# Patient Record
Sex: MALE | Race: BLACK OR AFRICAN AMERICAN | NOT HISPANIC OR LATINO | Employment: UNEMPLOYED | URBAN - METROPOLITAN AREA
[De-identification: names, ages, dates, MRNs, and addresses within clinical notes are randomized per-mention and may not be internally consistent; named-entity substitution may affect disease eponyms.]

---

## 2020-05-12 ENCOUNTER — TELEPHONE (OUTPATIENT)
Dept: INTERNAL MEDICINE | Facility: CLINIC | Age: 45
End: 2020-05-12

## 2020-05-12 ENCOUNTER — TELEPHONE (OUTPATIENT)
Dept: ORTHOPEDICS | Facility: CLINIC | Age: 45
End: 2020-05-12

## 2020-05-12 NOTE — TELEPHONE ENCOUNTER
----- Message from Amanda Sharpe sent at 5/12/2020  2:28 PM CDT -----  Contact: Pt  Patient is calling regarding scheduling an appointment with Ortho providers. Please call back at [phone]. Thanks

## 2022-09-09 ENCOUNTER — HOSPITAL ENCOUNTER (EMERGENCY)
Facility: HOSPITAL | Age: 47
Discharge: HOME | End: 2022-09-10
Attending: EMERGENCY MEDICINE
Payer: MEDICAID

## 2022-09-09 DIAGNOSIS — M43.16 SPONDYLOLISTHESIS OF LUMBAR REGION: Primary | ICD-10-CM

## 2022-09-09 DIAGNOSIS — N39.0 URINARY TRACT INFECTION WITHOUT HEMATURIA, SITE UNSPECIFIED: ICD-10-CM

## 2022-09-09 PROCEDURE — 99285 EMERGENCY DEPT VISIT HI MDM: CPT | Mod: 25

## 2022-09-10 VITALS
DIASTOLIC BLOOD PRESSURE: 78 MMHG | BODY MASS INDEX: 33.06 KG/M2 | WEIGHT: 244.06 LBS | HEART RATE: 84 BPM | OXYGEN SATURATION: 98 % | HEIGHT: 72 IN | SYSTOLIC BLOOD PRESSURE: 128 MMHG | TEMPERATURE: 98 F | RESPIRATION RATE: 18 BRPM

## 2022-09-10 LAB
ALBUMIN SERPL BCP-MCNC: 3.8 G/DL (ref 3.5–5.2)
ALP SERPL-CCNC: 67 U/L (ref 55–135)
ALT SERPL W/O P-5'-P-CCNC: 31 U/L (ref 10–44)
ANION GAP SERPL CALC-SCNC: 9 MMOL/L (ref 8–16)
AST SERPL-CCNC: 23 U/L (ref 10–40)
BASOPHILS # BLD AUTO: 0.04 K/UL (ref 0–0.2)
BASOPHILS NFR BLD: 0.6 % (ref 0–1.9)
BILIRUB SERPL-MCNC: 0.3 MG/DL (ref 0.1–1)
BILIRUB UR QL STRIP: NEGATIVE
BUN SERPL-MCNC: 14 MG/DL (ref 6–20)
CALCIUM SERPL-MCNC: 8.9 MG/DL (ref 8.7–10.5)
CHLORIDE SERPL-SCNC: 104 MMOL/L (ref 95–110)
CLARITY UR: CLEAR
CO2 SERPL-SCNC: 24 MMOL/L (ref 23–29)
COLOR UR: YELLOW
CREAT SERPL-MCNC: 1.2 MG/DL (ref 0.5–1.4)
DIFFERENTIAL METHOD: ABNORMAL
EOSINOPHIL # BLD AUTO: 0.4 K/UL (ref 0–0.5)
EOSINOPHIL NFR BLD: 4.9 % (ref 0–8)
ERYTHROCYTE [DISTWIDTH] IN BLOOD BY AUTOMATED COUNT: 13.8 % (ref 11.5–14.5)
EST. GFR  (NO RACE VARIABLE): >60 ML/MIN/1.73 M^2
GLUCOSE SERPL-MCNC: 97 MG/DL (ref 70–110)
GLUCOSE UR QL STRIP: NEGATIVE
HCT VFR BLD AUTO: 42.7 % (ref 40–54)
HGB BLD-MCNC: 14.5 G/DL (ref 14–18)
HGB UR QL STRIP: NEGATIVE
IMM GRANULOCYTES # BLD AUTO: 0.05 K/UL (ref 0–0.04)
IMM GRANULOCYTES NFR BLD AUTO: 0.7 % (ref 0–0.5)
KETONES UR QL STRIP: NEGATIVE
LEUKOCYTE ESTERASE UR QL STRIP: ABNORMAL
LYMPHOCYTES # BLD AUTO: 2.5 K/UL (ref 1–4.8)
LYMPHOCYTES NFR BLD: 35.2 % (ref 18–48)
MCH RBC QN AUTO: 30.6 PG (ref 27–31)
MCHC RBC AUTO-ENTMCNC: 34 G/DL (ref 32–36)
MCV RBC AUTO: 90 FL (ref 82–98)
MICROSCOPIC COMMENT: ABNORMAL
MONOCYTES # BLD AUTO: 0.8 K/UL (ref 0.3–1)
MONOCYTES NFR BLD: 10.5 % (ref 4–15)
NEUTROPHILS # BLD AUTO: 3.5 K/UL (ref 1.8–7.7)
NEUTROPHILS NFR BLD: 48.1 % (ref 38–73)
NITRITE UR QL STRIP: NEGATIVE
NRBC BLD-RTO: 0 /100 WBC
PH UR STRIP: 6 [PH] (ref 5–8)
PLATELET # BLD AUTO: 247 K/UL (ref 150–450)
PMV BLD AUTO: 10 FL (ref 9.2–12.9)
POTASSIUM SERPL-SCNC: 4.6 MMOL/L (ref 3.5–5.1)
PROT SERPL-MCNC: 7.4 G/DL (ref 6–8.4)
PROT UR QL STRIP: NEGATIVE
RBC # BLD AUTO: 4.74 M/UL (ref 4.6–6.2)
RBC #/AREA URNS HPF: 3 /HPF (ref 0–4)
SODIUM SERPL-SCNC: 137 MMOL/L (ref 136–145)
SP GR UR STRIP: 1.02 (ref 1–1.03)
SQUAMOUS #/AREA URNS HPF: 2 /HPF
URN SPEC COLLECT METH UR: ABNORMAL
UROBILINOGEN UR STRIP-ACNC: NEGATIVE EU/DL
WBC # BLD AUTO: 7.16 K/UL (ref 3.9–12.7)
WBC #/AREA URNS HPF: 51 /HPF (ref 0–5)
WBC CLUMPS URNS QL MICRO: ABNORMAL

## 2022-09-10 PROCEDURE — 80053 COMPREHEN METABOLIC PANEL: CPT | Performed by: EMERGENCY MEDICINE

## 2022-09-10 PROCEDURE — 85025 COMPLETE CBC W/AUTO DIFF WBC: CPT | Performed by: EMERGENCY MEDICINE

## 2022-09-10 PROCEDURE — 87147 CULTURE TYPE IMMUNOLOGIC: CPT | Performed by: EMERGENCY MEDICINE

## 2022-09-10 PROCEDURE — 96372 THER/PROPH/DIAG INJ SC/IM: CPT | Performed by: EMERGENCY MEDICINE

## 2022-09-10 PROCEDURE — 87086 URINE CULTURE/COLONY COUNT: CPT | Performed by: EMERGENCY MEDICINE

## 2022-09-10 PROCEDURE — 63600175 PHARM REV CODE 636 W HCPCS: Performed by: EMERGENCY MEDICINE

## 2022-09-10 PROCEDURE — 87088 URINE BACTERIA CULTURE: CPT | Performed by: EMERGENCY MEDICINE

## 2022-09-10 PROCEDURE — 81000 URINALYSIS NONAUTO W/SCOPE: CPT | Performed by: EMERGENCY MEDICINE

## 2022-09-10 RX ORDER — NAPROXEN 500 MG/1
500 TABLET ORAL 2 TIMES DAILY WITH MEALS
Qty: 60 TABLET | Refills: 0 | Status: SHIPPED | OUTPATIENT
Start: 2022-09-10

## 2022-09-10 RX ORDER — PANTOPRAZOLE SODIUM 20 MG/1
40 TABLET, DELAYED RELEASE ORAL DAILY
Qty: 60 TABLET | Refills: 0 | Status: SHIPPED | OUTPATIENT
Start: 2022-09-10 | End: 2022-10-10

## 2022-09-10 RX ORDER — KETOROLAC TROMETHAMINE 30 MG/ML
30 INJECTION, SOLUTION INTRAMUSCULAR; INTRAVENOUS
Status: COMPLETED | OUTPATIENT
Start: 2022-09-10 | End: 2022-09-10

## 2022-09-10 RX ORDER — CEFDINIR 300 MG/1
300 CAPSULE ORAL 2 TIMES DAILY
Qty: 20 CAPSULE | Refills: 0 | Status: SHIPPED | OUTPATIENT
Start: 2022-09-10 | End: 2022-09-20

## 2022-09-10 RX ORDER — KETOROLAC TROMETHAMINE 30 MG/ML
15 INJECTION, SOLUTION INTRAMUSCULAR; INTRAVENOUS
Status: DISCONTINUED | OUTPATIENT
Start: 2022-09-10 | End: 2022-09-10

## 2022-09-10 RX ADMIN — KETOROLAC TROMETHAMINE 30 MG: 30 INJECTION, SOLUTION INTRAMUSCULAR; INTRAVENOUS at 01:09

## 2022-09-10 NOTE — ED NOTES
Patient identifiers verified and correct for Efra Felipe.  Patient presents to ED with c/o lower back pain and bilateral ankle swelling that has been present for months, however the pain has progressively worsened. Pain is currently rated 10/10.    LOC: The patient is awake, alert and aware of environment with an appropriate affect, the patient is oriented x 3 and speaking appropriately.  APPEARANCE: Patient resting comfortably and in no acute distress, patient is clean and well groomed, patient's clothing is properly fastened.  SKIN: The skin is warm and dry, color consistent with ethnicity, patient has normal skin turgor and moist mucus membranes, skin intact, no breakdown or bruising noted.  MUSCULOSKELETAL: Patient moving all extremities spontaneously.  RESPIRATORY: Airway is open and patent, respirations are spontaneous.  CARDIAC: Patient has a normal rate, no periphreal edema noted, capillary refill < 3 seconds.  ABDOMEN: Soft and non tender to palpation.

## 2022-09-10 NOTE — ED PROVIDER NOTES
SCRIBE #1 NOTE: I, Bo Mota, am scribing for, and in the presence of, Hilairo Bradley MD. I have scribed the entire note.       History     Chief Complaint   Patient presents with    Joint Swelling     Bilat ankle swelling and lower back pain x 6 months. Worse today. Denies cp or sob     Review of patient's allergies indicates:   Allergen Reactions    Iodine and iodide containing products Itching    Shellfish containing products Itching         History of Present Illness     HPI    9/9/2022, 11:51 PM  History obtained from the patient      History of Present Illness: Efra Felipe is a 47 y.o. male patient who presents to the Emergency Department for evaluation of lower Back Pain which onset gradually over the course of 6 months. The pt has been dealing with the pain but decided it was time to get it evaluated. Symptoms are constant and moderate in severity. No mitigating or exacerbating factors reported. Associated sxs include pain radiating down both legs, and swollen ankles. Patient denies any HA, fever, dysuria, abd pain, b/b dysfunction, IVDU, trauma, fever, chills, malignancy, and all other sxs at this time. No Prior Tx. No further complaints or concerns at this time.       Arrival mode: Personal vehicle     PCP: Primary Doctor No        Past Medical History:  No past medical history on file.    Past Surgical History:  No past surgical history on file.      Family History:  Family History   Problem Relation Age of Onset    Birth defects Neg Hx        Social History:  Social History     Tobacco Use    Smoking status: Every Day     Packs/day: 1.00     Types: Cigarettes    Smokeless tobacco: Not on file   Substance and Sexual Activity    Alcohol use: No    Drug use: No    Sexual activity: Not on file        Review of Systems     Review of Systems   Constitutional:  Negative for chills and fever.   HENT:  Negative for congestion and sore throat.    Respiratory:  Negative for shortness of breath.     Cardiovascular:  Negative for chest pain.   Gastrointestinal:  Negative for abdominal pain and nausea.   Genitourinary:  Negative for dysuria.   Musculoskeletal:  Positive for back pain, joint swelling (Ankles) and myalgias (Generalized legs).   Skin:  Negative for rash.   Neurological:  Negative for weakness and headaches.   Hematological:  Does not bruise/bleed easily.   All other systems reviewed and are negative.     Physical Exam     Initial Vitals [09/09/22 2211]   BP Pulse Resp Temp SpO2   133/84 89 18 98.7 °F (37.1 °C) 97 %      MAP       --          Physical Exam  Nursing Notes and Vital Signs Reviewed.  Constitutional: Patient is in no acute distress. Well-developed and well-nourished.  Head: Atraumatic. Normocephalic.  Eyes: PERRL. EOM intact. Conjunctivae are not pale. No scleral icterus.  ENT: Mucous membranes are moist. Oropharynx is clear and symmetric.    Neck: Supple. Full ROM. No lymphadenopathy.  Cardiovascular: Regular rate. Regular rhythm. No murmurs, rubs, or gallops. Distal pulses are 2+ and symmetric.  Pulmonary/Chest: No respiratory distress. Clear to auscultation bilaterally. No wheezing or rales.  Abdominal: Soft and non-distended.  There is no tenderness.  No rebound, guarding, or rigidity. Good bowel sounds.  Genitourinary: No CVA tenderness  Musculoskeletal: Moves all extremities. No obvious deformities. No pitting edema. No calf tenderness. No midline spinal tenderness. No pelvic instability.   Skin: Warm and dry.  Neurological:  Alert, awake, and appropriate.  Normal speech.  No acute focal neurological deficits are appreciated. Stands and ambulates independently. Sensation intact globally to light touch.   Psychiatric: Normal affect. Good eye contact. Appropriate in content.     ED Course   Procedures  ED Vital Signs:  Vitals:    09/09/22 2211 09/10/22 0115   BP: 133/84 128/78   Pulse: 89 84   Resp: 18 18   Temp: 98.7 °F (37.1 °C) 98.2 °F (36.8 °C)   TempSrc: Oral Oral   SpO2: 97%  98%   Weight: 110.7 kg (244 lb 0.8 oz)    Height: 6' (1.829 m)        Abnormal Lab Results:  Labs Reviewed   CBC W/ AUTO DIFFERENTIAL - Abnormal; Notable for the following components:       Result Value    Immature Granulocytes 0.7 (*)     Immature Grans (Abs) 0.05 (*)     All other components within normal limits   URINALYSIS, REFLEX TO URINE CULTURE - Abnormal; Notable for the following components:    Leukocytes, UA 3+ (*)     All other components within normal limits    Narrative:     Specimen Source->Urine   URINALYSIS MICROSCOPIC - Abnormal; Notable for the following components:    WBC, UA 51 (*)     WBC Clumps, UA Few (*)     All other components within normal limits    Narrative:     Specimen Source->Urine   CULTURE, URINE   COMPREHENSIVE METABOLIC PANEL        All Lab Results:  Results for orders placed or performed during the hospital encounter of 09/09/22   CBC auto differential   Result Value Ref Range    WBC 7.16 3.90 - 12.70 K/uL    RBC 4.74 4.60 - 6.20 M/uL    Hemoglobin 14.5 14.0 - 18.0 g/dL    Hematocrit 42.7 40.0 - 54.0 %    MCV 90 82 - 98 fL    MCH 30.6 27.0 - 31.0 pg    MCHC 34.0 32.0 - 36.0 g/dL    RDW 13.8 11.5 - 14.5 %    Platelets 247 150 - 450 K/uL    MPV 10.0 9.2 - 12.9 fL    Immature Granulocytes 0.7 (H) 0.0 - 0.5 %    Gran # (ANC) 3.5 1.8 - 7.7 K/uL    Immature Grans (Abs) 0.05 (H) 0.00 - 0.04 K/uL    Lymph # 2.5 1.0 - 4.8 K/uL    Mono # 0.8 0.3 - 1.0 K/uL    Eos # 0.4 0.0 - 0.5 K/uL    Baso # 0.04 0.00 - 0.20 K/uL    nRBC 0 0 /100 WBC    Gran % 48.1 38.0 - 73.0 %    Lymph % 35.2 18.0 - 48.0 %    Mono % 10.5 4.0 - 15.0 %    Eosinophil % 4.9 0.0 - 8.0 %    Basophil % 0.6 0.0 - 1.9 %    Differential Method Automated    Comprehensive metabolic panel   Result Value Ref Range    Sodium 137 136 - 145 mmol/L    Potassium 4.6 3.5 - 5.1 mmol/L    Chloride 104 95 - 110 mmol/L    CO2 24 23 - 29 mmol/L    Glucose 97 70 - 110 mg/dL    BUN 14 6 - 20 mg/dL    Creatinine 1.2 0.5 - 1.4 mg/dL    Calcium 8.9  8.7 - 10.5 mg/dL    Total Protein 7.4 6.0 - 8.4 g/dL    Albumin 3.8 3.5 - 5.2 g/dL    Total Bilirubin 0.3 0.1 - 1.0 mg/dL    Alkaline Phosphatase 67 55 - 135 U/L    AST 23 10 - 40 U/L    ALT 31 10 - 44 U/L    Anion Gap 9 8 - 16 mmol/L    eGFR >60 >60 mL/min/1.73 m^2   Urinalysis, Reflex to Urine Culture Urine, Clean Catch    Specimen: Urine   Result Value Ref Range    Specimen UA Urine, Clean Catch     Color, UA Yellow Yellow, Straw, Joana    Appearance, UA Clear Clear    pH, UA 6.0 5.0 - 8.0    Specific Gravity, UA 1.020 1.005 - 1.030    Protein, UA Negative Negative    Glucose, UA Negative Negative    Ketones, UA Negative Negative    Bilirubin (UA) Negative Negative    Occult Blood UA Negative Negative    Nitrite, UA Negative Negative    Urobilinogen, UA Negative <2.0 EU/dL    Leukocytes, UA 3+ (A) Negative   Urinalysis Microscopic   Result Value Ref Range    RBC, UA 3 0 - 4 /hpf    WBC, UA 51 (H) 0 - 5 /hpf    WBC Clumps, UA Few (A) None-Rare    Squam Epithel, UA 2 /hpf    Microscopic Comment SEE COMMENT         Imaging Results:  Imaging Results               CT Renal Stone Study ABD Pelvis WO (Final result)  Result time 09/10/22 00:11:38      Final result by Jae Faulkner MD (09/10/22 00:11:38)                   Impression:      Cholelithiasis without definite acute cholecystitis.    L4 pars defects bilaterally with grade 2 anterolisthesis of L4 on L5 and associated osseous sclerosis with near complete disc space height loss and uncovering of the disc at this level, a potential source of lower back pain.    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    This report was flagged in Epic as abnormal.    All CT scans at this facility are performed  using dose modulation techniques as appropriate to performed exam including the following:  automated exposure control; adjustment of mA and/or kV according to the patients size (this includes techniques or standardized protocols for targeted exams where dose is matched to  indication/reason for exam: i.e. extremities or head);  iterative reconstruction technique.      Electronically signed by: Jae Lucie  Date:    09/10/2022  Time:    00:11               Narrative:    EXAMINATION:  CT RENAL STONE STUDY ABD PELVIS WO    CLINICAL HISTORY:  Flank pain, kidney stone suspected;    TECHNIQUE:  Low dose axial images, sagittal and coronal reformations were obtained from the lung bases to the pubic symphysis.  Contrast was not administered.    COMPARISON:  None    FINDINGS:  Heart: Normal in size. No pericardial effusion.    Lung Bases: Well aerated, without consolidation or pleural fluid.    Liver: Normal in size and attenuation, with no focal hepatic lesions.    Gallbladder: Cholelithiasis without findings to specifically suggest acute cholecystitis.    Bile Ducts: No evidence of dilated ducts.    Pancreas: No mass or peripancreatic fat stranding.    Spleen: Unremarkable.    Adrenals: Unremarkable.    Kidneys/ Ureters: No hydronephrosis.  No obstructive uropathy.  No nonobstructive nephrolithiasis.    Bladder: No evidence of wall thickening.    Reproductive organs: Unremarkable.    GI Tract/Mesentery: No evidence of bowel obstruction or inflammation.  No evidence of appendicitis.    Peritoneal Space: No ascites. No free air.  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    Retroperitoneum: No significant adenopathy.    Abdominal wall: Unremarkable.    Vasculature: No significant atherosclerosis or aneurysm.    Bones: No acute fracture.  L4 pars defects bilaterally with grade 2 anterolisthesis of L4 on L5 and associated osseous sclerosis with near complete disc space height loss and uncovering of the disc at this level, a potential source of lower back pain.                                                  The Emergency Provider reviewed the vital signs and test results, which are outlined above.     ED Discussion       1:25 AM: Reassessed pt at this time.   Discussed with pt all pertinent ED  information and results. Discussed pt dx and plan of tx. Gave pt all f/u and return to the ED instructions. All questions and concerns were addressed at this time. Pt expresses understanding of information and instructions, and is comfortable with plan to discharge. Pt is stable for discharge.    I discussed with patient and/or family/caretaker that evaluation in the ED does not suggest any emergent or life threatening medical conditions requiring immediate intervention beyond what was provided in the ED, and I believe patient is safe for discharge.  Regardless, an unremarkable evaluation in the ED does not preclude the development or presence of a serious of life threatening condition. As such, patient was instructed to return immediately for any worsening or change in current symptoms.     ED Course as of 09/10/22 0344   Sat Sep 10, 2022   0056 Patient with no red flags of back pain in the ED. Will trial NSAIDs, given f/u instructions.  [BA]      ED Course User Index  [BA] Hilario Bradley MD     Medical Decision Making:   Clinical Tests:   Lab Tests: Ordered and Reviewed  Radiological Study: Ordered and Reviewed         ED Medication(s):  Medications   ketorolac injection 30 mg (30 mg Intramuscular Given 9/10/22 0108)       Discharge Medication List as of 9/10/2022 12:56 AM        START taking these medications    Details   cefdinir (OMNICEF) 300 MG capsule Take 1 capsule (300 mg total) by mouth 2 (two) times daily. for 10 days, Starting Sat 9/10/2022, Until Tue 9/20/2022, Normal      naproxen (NAPROSYN) 500 MG tablet Take 1 tablet (500 mg total) by mouth 2 (two) times daily with meals., Starting Sat 9/10/2022, Normal      pantoprazole (PROTONIX) 20 MG tablet Take 2 tablets (40 mg total) by mouth once daily., Starting Sat 9/10/2022, Until Mon 10/10/2022, Normal              Follow-up Information       [facility] - Emergency Dept.. Go today.    Specialty: Emergency Medicine  Why: If symptoms worsen, For re-evaluation  and further treatment, As needed  Contact information:  [address] [facility] [address] [phone]             [facility]. Schedule an appointment as soon as possible for a visit in 1 week.    Specialty: Neurosurgery  Why: For re-evaluation and further treatment  Contact information:  [address] [facility] [id number] [phone]  Additional information:  Please park on the [address] side and use the Clinic entrance. Check in at Patient Registration or use a kiosk for self check-in.             [facility] - Emergency Dept.. Go today.    Specialty: Emergency Medicine  Why: If symptoms worsen, For re-evaluation and further treatment, As needed  Contact information:  [address] [facility] [address] [phone]             Your Primary Care Provider. Schedule an appointment as soon as possible for a visit in 1 week.    Why: For re-evaluation and further treatment                               Scribe Attestation:   Scribe #1: I performed the above scribed service and the documentation accurately describes the services I performed. I attest to the accuracy of the note.     Attending:   Physician Attestation Statement for Scribe #1: I, Hilario Bradley MD, personally performed the services described in this documentation, as scribed by Bo Mota, in my presence, and it is both accurate and complete.           Clinical Impression       ICD-10-CM ICD-9-CM   1. Spondylolisthesis of lumbar region  M43.16 738.4   2. Urinary tract infection without hematuria, site unspecified  N39.0 599.0       Disposition:   Disposition: Discharged  Condition: Stable      Hilario Bradley MD  09/10/22 0346

## 2022-09-11 LAB — BACTERIA UR CULT: ABNORMAL

## 2024-04-03 ENCOUNTER — HOSPITAL ENCOUNTER (INPATIENT)
Facility: HOSPITAL | Age: 49
LOS: 3 days | Discharge: HOME | DRG: 419 | End: 2024-04-06
Attending: STUDENT IN AN ORGANIZED HEALTH CARE EDUCATION/TRAINING PROGRAM | Admitting: HOSPITALIST
Payer: MEDICAID

## 2024-04-03 DIAGNOSIS — R10.9 FLANK PAIN: ICD-10-CM

## 2024-04-03 DIAGNOSIS — R07.9 CHEST PAIN: ICD-10-CM

## 2024-04-03 DIAGNOSIS — K80.70 CHOLELITHIASIS WITH CHOLEDOCHOLITHIASIS: Primary | ICD-10-CM

## 2024-04-03 DIAGNOSIS — R74.8 ELEVATED LIVER ENZYMES: ICD-10-CM

## 2024-04-03 DIAGNOSIS — K80.50 CHOLEDOCHOLITHIASIS: ICD-10-CM

## 2024-04-03 LAB
ALBUMIN SERPL BCP-MCNC: 3.8 G/DL (ref 3.5–5.2)
ALP SERPL-CCNC: 459 U/L (ref 55–135)
ALT SERPL W/O P-5'-P-CCNC: 685 U/L (ref 10–44)
ANION GAP SERPL CALC-SCNC: 12 MMOL/L (ref 8–16)
AST SERPL-CCNC: 279 U/L (ref 10–40)
BASOPHILS # BLD AUTO: 0.04 K/UL (ref 0–0.2)
BASOPHILS NFR BLD: 0.7 % (ref 0–1.9)
BILIRUB SERPL-MCNC: 4.5 MG/DL (ref 0.1–1)
BUN SERPL-MCNC: 12 MG/DL (ref 6–20)
CALCIUM SERPL-MCNC: 9.7 MG/DL (ref 8.7–10.5)
CHLORIDE SERPL-SCNC: 105 MMOL/L (ref 95–110)
CO2 SERPL-SCNC: 21 MMOL/L (ref 23–29)
CREAT SERPL-MCNC: 1.1 MG/DL (ref 0.5–1.4)
DIFFERENTIAL METHOD BLD: ABNORMAL
EOSINOPHIL # BLD AUTO: 0.3 K/UL (ref 0–0.5)
EOSINOPHIL NFR BLD: 4.6 % (ref 0–8)
ERYTHROCYTE [DISTWIDTH] IN BLOOD BY AUTOMATED COUNT: 15.1 % (ref 11.5–14.5)
EST. GFR  (NO RACE VARIABLE): >60 ML/MIN/1.73 M^2
GLUCOSE SERPL-MCNC: 100 MG/DL (ref 70–110)
HCT VFR BLD AUTO: 43.5 % (ref 40–54)
HGB BLD-MCNC: 14.8 G/DL (ref 14–18)
IMM GRANULOCYTES # BLD AUTO: 0.02 K/UL (ref 0–0.04)
IMM GRANULOCYTES NFR BLD AUTO: 0.3 % (ref 0–0.5)
LIPASE SERPL-CCNC: 38 U/L (ref 4–60)
LYMPHOCYTES # BLD AUTO: 2.5 K/UL (ref 1–4.8)
LYMPHOCYTES NFR BLD: 42.1 % (ref 18–48)
MCH RBC QN AUTO: 30.7 PG (ref 27–31)
MCHC RBC AUTO-ENTMCNC: 34 G/DL (ref 32–36)
MCV RBC AUTO: 90 FL (ref 82–98)
MONOCYTES # BLD AUTO: 0.4 K/UL (ref 0.3–1)
MONOCYTES NFR BLD: 7.3 % (ref 4–15)
NEUTROPHILS # BLD AUTO: 2.6 K/UL (ref 1.8–7.7)
NEUTROPHILS NFR BLD: 45 % (ref 38–73)
NRBC BLD-RTO: 0 /100 WBC
PLATELET # BLD AUTO: 240 K/UL (ref 150–450)
PMV BLD AUTO: 10.5 FL (ref 9.2–12.9)
POTASSIUM SERPL-SCNC: 4.1 MMOL/L (ref 3.5–5.1)
PROT SERPL-MCNC: 8 G/DL (ref 6–8.4)
RBC # BLD AUTO: 4.82 M/UL (ref 4.6–6.2)
SODIUM SERPL-SCNC: 138 MMOL/L (ref 136–145)
WBC # BLD AUTO: 5.87 K/UL (ref 3.9–12.7)

## 2024-04-03 PROCEDURE — 25000003 PHARM REV CODE 250: Performed by: NURSE PRACTITIONER

## 2024-04-03 PROCEDURE — 63600175 PHARM REV CODE 636 W HCPCS: Performed by: NURSE PRACTITIONER

## 2024-04-03 PROCEDURE — 83690 ASSAY OF LIPASE: CPT | Performed by: STUDENT IN AN ORGANIZED HEALTH CARE EDUCATION/TRAINING PROGRAM

## 2024-04-03 PROCEDURE — 11000001 HC ACUTE MED/SURG PRIVATE ROOM

## 2024-04-03 PROCEDURE — 80053 COMPREHEN METABOLIC PANEL: CPT | Performed by: STUDENT IN AN ORGANIZED HEALTH CARE EDUCATION/TRAINING PROGRAM

## 2024-04-03 PROCEDURE — 85025 COMPLETE CBC W/AUTO DIFF WBC: CPT | Performed by: STUDENT IN AN ORGANIZED HEALTH CARE EDUCATION/TRAINING PROGRAM

## 2024-04-03 RX ORDER — SODIUM CHLORIDE 0.9 % (FLUSH) 0.9 %
3 SYRINGE (ML) INJECTION EVERY 12 HOURS PRN
Status: DISCONTINUED | OUTPATIENT
Start: 2024-04-03 | End: 2024-04-06 | Stop reason: HOSPADM

## 2024-04-03 RX ORDER — HYDROCODONE BITARTRATE AND ACETAMINOPHEN 5; 325 MG/1; MG/1
1 TABLET ORAL EVERY 6 HOURS PRN
Status: DISCONTINUED | OUTPATIENT
Start: 2024-04-03 | End: 2024-04-03

## 2024-04-03 RX ORDER — MORPHINE SULFATE 4 MG/ML
4 INJECTION, SOLUTION INTRAMUSCULAR; INTRAVENOUS EVERY 4 HOURS PRN
Status: DISCONTINUED | OUTPATIENT
Start: 2024-04-03 | End: 2024-04-06 | Stop reason: HOSPADM

## 2024-04-03 RX ORDER — NALOXONE HCL 0.4 MG/ML
0.02 VIAL (ML) INJECTION
Status: DISCONTINUED | OUTPATIENT
Start: 2024-04-03 | End: 2024-04-06 | Stop reason: HOSPADM

## 2024-04-03 RX ORDER — SODIUM CHLORIDE, SODIUM LACTATE, POTASSIUM CHLORIDE, CALCIUM CHLORIDE 600; 310; 30; 20 MG/100ML; MG/100ML; MG/100ML; MG/100ML
INJECTION, SOLUTION INTRAVENOUS CONTINUOUS
Status: DISCONTINUED | OUTPATIENT
Start: 2024-04-03 | End: 2024-04-06 | Stop reason: HOSPADM

## 2024-04-03 RX ORDER — ALUMINUM HYDROXIDE, MAGNESIUM HYDROXIDE, AND SIMETHICONE 1200; 120; 1200 MG/30ML; MG/30ML; MG/30ML
30 SUSPENSION ORAL 4 TIMES DAILY PRN
Status: DISCONTINUED | OUTPATIENT
Start: 2024-04-03 | End: 2024-04-06 | Stop reason: HOSPADM

## 2024-04-03 RX ORDER — TALC
6 POWDER (GRAM) TOPICAL NIGHTLY PRN
Status: DISCONTINUED | OUTPATIENT
Start: 2024-04-03 | End: 2024-04-06 | Stop reason: HOSPADM

## 2024-04-03 RX ORDER — SIMETHICONE 80 MG
1 TABLET,CHEWABLE ORAL 4 TIMES DAILY PRN
Status: DISCONTINUED | OUTPATIENT
Start: 2024-04-03 | End: 2024-04-06 | Stop reason: HOSPADM

## 2024-04-03 RX ORDER — OXYCODONE HYDROCHLORIDE 5 MG/1
5 TABLET ORAL EVERY 6 HOURS PRN
Status: DISCONTINUED | OUTPATIENT
Start: 2024-04-04 | End: 2024-04-06 | Stop reason: HOSPADM

## 2024-04-03 RX ORDER — PROCHLORPERAZINE EDISYLATE 5 MG/ML
5 INJECTION INTRAMUSCULAR; INTRAVENOUS EVERY 6 HOURS PRN
Status: DISCONTINUED | OUTPATIENT
Start: 2024-04-03 | End: 2024-04-06 | Stop reason: HOSPADM

## 2024-04-03 RX ORDER — GLUCAGON 1 MG
1 KIT INJECTION
Status: DISCONTINUED | OUTPATIENT
Start: 2024-04-03 | End: 2024-04-06 | Stop reason: HOSPADM

## 2024-04-03 RX ORDER — IBUPROFEN 200 MG
24 TABLET ORAL
Status: DISCONTINUED | OUTPATIENT
Start: 2024-04-03 | End: 2024-04-06 | Stop reason: HOSPADM

## 2024-04-03 RX ORDER — IBUPROFEN 200 MG
16 TABLET ORAL
Status: DISCONTINUED | OUTPATIENT
Start: 2024-04-03 | End: 2024-04-06 | Stop reason: HOSPADM

## 2024-04-03 RX ORDER — ACETAMINOPHEN 325 MG/1
650 TABLET ORAL EVERY 6 HOURS PRN
Status: DISCONTINUED | OUTPATIENT
Start: 2024-04-03 | End: 2024-04-03

## 2024-04-03 RX ORDER — ONDANSETRON HYDROCHLORIDE 2 MG/ML
4 INJECTION, SOLUTION INTRAVENOUS EVERY 6 HOURS PRN
Status: DISCONTINUED | OUTPATIENT
Start: 2024-04-03 | End: 2024-04-06 | Stop reason: HOSPADM

## 2024-04-03 RX ORDER — ACETAMINOPHEN 650 MG/1
650 SUPPOSITORY RECTAL EVERY 4 HOURS PRN
Status: DISCONTINUED | OUTPATIENT
Start: 2024-04-03 | End: 2024-04-03

## 2024-04-03 RX ADMIN — SODIUM CHLORIDE, POTASSIUM CHLORIDE, SODIUM LACTATE AND CALCIUM CHLORIDE: 600; 310; 30; 20 INJECTION, SOLUTION INTRAVENOUS at 08:04

## 2024-04-03 RX ADMIN — PIPERACILLIN SODIUM AND TAZOBACTAM SODIUM 4.5 G: 4; .5 INJECTION, POWDER, FOR SOLUTION INTRAVENOUS at 08:04

## 2024-04-03 NOTE — ED PROVIDER NOTES
"SCRIBE #1 NOTE: I, Ludy Person, am scribing for, and in the presence of, William Hummel MD. I have scribed the entire note.       History     Chief Complaint   Patient presents with    abnormal labs     Pt refused care for further evaluation and possible surgery for gall stones by leaving ama. States I'm back for my surgery.      Review of patient's allergies indicates:   Allergen Reactions    Iodine     Iodine and iodide containing products Itching    Shellfish containing products Itching         History of Present Illness     HPI    4/3/2024, 11:28 AM  History obtained from the patient      History of Present Illness: Efra Felipe is a 48 y.o. male patient with no PMHx on file who presents to the Emergency Department for evaluation of an abnormal lab result which was taken today. The pt was seen here in the ED last night complaining of flank pain. While here, he was dx with gallstones and told that he would need to have a procedure done, but left AMA before he could be admitted. The pt has now returned to the ED and states that he is "back for his surgery." Symptoms are constant and moderate in severity. No mitigating or exacerbating factors reported. Patient denies any fever, chills, n/v/d, CP, SOB, and all other sxs at this time. No further complaints or concerns at this time.       Arrival mode: Personal vehicle    PCP: No, Primary Doctor        Past Medical History:  No past medical history on file.    Past Surgical History:  No past surgical history on file.      Family History:  Family History   Problem Relation Age of Onset    Birth defects Neg Hx        Social History:  Social History     Tobacco Use    Smoking status: Every Day     Current packs/day: 1.00     Types: Cigarettes    Smokeless tobacco: Not on file   Substance and Sexual Activity    Alcohol use: No    Drug use: No    Sexual activity: Not on file        Review of Systems     Review of Systems   Constitutional:  Negative for chills " and fever.   HENT:  Negative for sore throat.    Respiratory:  Negative for shortness of breath.    Cardiovascular:  Negative for chest pain.   Gastrointestinal:  Negative for diarrhea, nausea and vomiting.   Genitourinary:  Negative for dysuria.   Musculoskeletal:  Negative for back pain.   Skin:  Negative for rash.   Neurological:  Negative for weakness.   Hematological:  Does not bruise/bleed easily.   All other systems reviewed and are negative.     Physical Exam     Initial Vitals [04/03/24 1026]   BP Pulse Resp Temp SpO2   120/80 62 18 98.2 °F (36.8 °C) 98 %      MAP       --          Physical Exam  Nursing Notes and Vital Signs Reviewed.  Constitutional: Patient is in no acute distress. Well-developed and well-nourished.  Head: Atraumatic. Normocephalic.  Eyes: PERRL. EOM intact. Conjunctivae are not pale. No scleral icterus.  ENT: Mucous membranes are moist. Oropharynx is clear and symmetric.    Neck: Supple. Full ROM. No lymphadenopathy.  Cardiovascular: Regular rate. Regular rhythm. No murmurs, rubs, or gallops. Distal pulses are 2+ and symmetric.  Pulmonary/Chest: No respiratory distress. Clear to auscultation bilaterally. No wheezing or rales.  Abdominal: Soft and non-distended.  There is no tenderness.  No rebound, guarding, or rigidity. Good bowel sounds.  Genitourinary: No CVA tenderness  Musculoskeletal: Moves all extremities. No obvious deformities. No edema. No calf tenderness.  Skin: Warm and dry.  Neurological:  Alert, awake, and appropriate.  Normal speech.  No acute focal neurological deficits are appreciated.  Psychiatric: Normal affect. Good eye contact. Appropriate in content.     ED Course   Procedures  ED Vital Signs:  Vitals:    04/03/24 1026 04/03/24 1310 04/03/24 1500 04/03/24 1700   BP: 120/80 123/81 122/83 125/82   Pulse: 62 65 62 63   Resp: 18 18 18 18   Temp: 98.2 °F (36.8 °C)      TempSrc: Oral      SpO2: 98% 98% 99% 98%   Weight: 104.6 kg (230 lb 9.6 oz)          Abnormal Lab  Results:  Labs Reviewed   COMPREHENSIVE METABOLIC PANEL - Abnormal; Notable for the following components:       Result Value    CO2 21 (*)     Total Bilirubin 4.5 (*)     Alkaline Phosphatase 459 (*)      (*)      (*)     All other components within normal limits   CBC W/ AUTO DIFFERENTIAL - Abnormal; Notable for the following components:    RDW 15.1 (*)     All other components within normal limits   LIPASE        All Lab Results:  Results for orders placed or performed during the hospital encounter of 04/03/24   Comprehensive metabolic panel   Result Value Ref Range    Sodium 138 136 - 145 mmol/L    Potassium 4.1 3.5 - 5.1 mmol/L    Chloride 105 95 - 110 mmol/L    CO2 21 (L) 23 - 29 mmol/L    Glucose 100 70 - 110 mg/dL    BUN 12 6 - 20 mg/dL    Creatinine 1.1 0.5 - 1.4 mg/dL    Calcium 9.7 8.7 - 10.5 mg/dL    Total Protein 8.0 6.0 - 8.4 g/dL    Albumin 3.8 3.5 - 5.2 g/dL    Total Bilirubin 4.5 (H) 0.1 - 1.0 mg/dL    Alkaline Phosphatase 459 (H) 55 - 135 U/L     (H) 10 - 40 U/L     (H) 10 - 44 U/L    eGFR >60 >60 mL/min/1.73 m^2    Anion Gap 12 8 - 16 mmol/L   CBC auto differential   Result Value Ref Range    WBC 5.87 3.90 - 12.70 K/uL    RBC 4.82 4.60 - 6.20 M/uL    Hemoglobin 14.8 14.0 - 18.0 g/dL    Hematocrit 43.5 40.0 - 54.0 %    MCV 90 82 - 98 fL    MCH 30.7 27.0 - 31.0 pg    MCHC 34.0 32.0 - 36.0 g/dL    RDW 15.1 (H) 11.5 - 14.5 %    Platelets 240 150 - 450 K/uL    MPV 10.5 9.2 - 12.9 fL    Immature Granulocytes 0.3 0.0 - 0.5 %    Gran # (ANC) 2.6 1.8 - 7.7 K/uL    Immature Grans (Abs) 0.02 0.00 - 0.04 K/uL    Lymph # 2.5 1.0 - 4.8 K/uL    Mono # 0.4 0.3 - 1.0 K/uL    Eos # 0.3 0.0 - 0.5 K/uL    Baso # 0.04 0.00 - 0.20 K/uL    nRBC 0 0 /100 WBC    Gran % 45.0 38.0 - 73.0 %    Lymph % 42.1 18.0 - 48.0 %    Mono % 7.3 4.0 - 15.0 %    Eosinophil % 4.6 0.0 - 8.0 %    Basophil % 0.7 0.0 - 1.9 %    Differential Method Automated    Lipase   Result Value Ref Range    Lipase 38 4 - 60 U/L                  The Emergency Provider reviewed the vital signs and test results, which are outlined above.     ED Discussion     7:22 PM: Discussed pt's case with Dr. Brian (Gastroenterology) who recommends placing the patient on antibiotics and keeping him NPO. She states that if he stays, we may be able to get him down to [place] tomorrow for an ERCP.    7:37 PM: Discussed case with Cezar Sanchez NP ([facility]). Dr. Sanchez agrees with current care and management of pt and accepts admission.   Admitting Service: [facility]  Admitting Physician: Dr. Sanchez  Admit to: Med Surg     7:40 PM: Re-evaluated pt. I have discussed test results, shared treatment plan, and the need for admission with patient and family at bedside. Pt and family express understanding at this time and agree with all information. All questions answered. Pt and family have no further questions or concerns at this time. Pt is ready for admit.     ED Course as of 04/03/24 1944 Wed Apr 03, 2024   1349 WBC: 5.87 [MC]   1349 Hemoglobin: 14.8 [MC]   1349 Hematocrit: 43.5 [MC]   1407 BILIRUBIN TOTAL(!): 4.5 [MC]   1407 ALP(!): 459 [MC]   1407 AST(!): 279 [MC]   1407 ALT(!): 685 [MC]   1921 Sodium: 138 [MC]   1921 Chloride: 105 [MC]   1921 WBC: 5.87 [MC]   1921 Hemoglobin: 14.8 [MC]      ED Course User Index  [MC] William Hummel MD     Medical Decision Making  Ddx includes but is not limited to choledocholithiasis, cholecystitis, pancreatitis, among others.    Patient returns to the ER for his procedure after leaving AMA last night. Last night's work-up demonstrates a CT scan showing signs of a common bile duct dilation suspicious for choledocholithiasis. Repeat CT scan considered - but patient's pain has not worsened, no indication. Blood work repeated, demonstrates elevated liver enzymes and elevated bilirubin.     Amount and/or Complexity of Data Reviewed  Independent Historian: friend     Details: Family member with  the patient assisted with keeping the patient in the ER and preventing him from leaving AMA again  Labs: ordered. Decision-making details documented in ED Course.     Details: Elevated bili and liver enzymes    Risk  OTC drugs.  Prescription drug management.  Decision regarding hospitalization.  Diagnosis or treatment significantly limited by social determinants of health.  Risk Details: Poor health literacy substantially impacted today's visit and disposition                ED Medication(s):  Medications   sodium chloride 0.9% flush 3 mL (has no administration in time range)   lactated ringers infusion (has no administration in time range)   melatonin tablet 6 mg (has no administration in time range)   ondansetron injection 4 mg (has no administration in time range)   acetaminophen tablet 650 mg (has no administration in time range)   simethicone chewable tablet 80 mg (has no administration in time range)   aluminum-magnesium hydroxide-simethicone 200-200-20 mg/5 mL suspension 30 mL (has no administration in time range)   acetaminophen suppository 650 mg (has no administration in time range)   HYDROcodone-acetaminophen 5-325 mg per tablet 1 tablet (has no administration in time range)   morphine injection 4 mg (has no administration in time range)   naloxone 0.4 mg/mL injection 0.02 mg (has no administration in time range)   glucose chewable tablet 16 g (has no administration in time range)   glucose chewable tablet 24 g (has no administration in time range)   glucagon (human recombinant) injection 1 mg (has no administration in time range)   prochlorperazine injection Soln 5 mg (has no administration in time range)   dextrose 10% bolus 125 mL 125 mL (has no administration in time range)   dextrose 10% bolus 250 mL 250 mL (has no administration in time range)       New Prescriptions    No medications on file               Scribe Attestation:   Scribe #1: I performed the above scribed service and the documentation  accurately describes the services I performed. I attest to the accuracy of the note.     Attending:   Physician Attestation Statement for Scribe #1: I, William Hummel MD, personally performed the services described in this documentation, as scribed by Ludy Person, in my presence, and it is both accurate and complete.           Clinical Impression       ICD-10-CM ICD-9-CM   1. Choledocholithiasis  K80.50 574.50   2. Elevated liver enzymes  R74.8 790.5   3. Flank pain  R10.9 789.09   4. Chest pain  R07.9 786.50       Disposition:   Disposition: Admitted  Condition: Fair        William Hummel MD  04/03/24 1944

## 2024-04-04 ENCOUNTER — ANESTHESIA (OUTPATIENT)
Dept: ENDOSCOPY | Facility: HOSPITAL | Age: 49
End: 2024-04-04
Payer: MEDICAID

## 2024-04-04 ENCOUNTER — ANESTHESIA EVENT (OUTPATIENT)
Dept: ENDOSCOPY | Facility: HOSPITAL | Age: 49
End: 2024-04-04
Payer: MEDICAID

## 2024-04-04 ENCOUNTER — HOSPITAL ENCOUNTER (OUTPATIENT)
Facility: HOSPITAL | Age: 49
Discharge: HOME | End: 2024-04-04
Attending: INTERNAL MEDICINE | Admitting: INTERNAL MEDICINE
Payer: MEDICAID

## 2024-04-04 VITALS
BODY MASS INDEX: 31.15 KG/M2 | HEART RATE: 51 BPM | WEIGHT: 230 LBS | TEMPERATURE: 98 F | RESPIRATION RATE: 25 BRPM | SYSTOLIC BLOOD PRESSURE: 107 MMHG | DIASTOLIC BLOOD PRESSURE: 59 MMHG | HEIGHT: 72 IN | OXYGEN SATURATION: 99 %

## 2024-04-04 PROBLEM — K80.70 CHOLELITHIASIS WITH CHOLEDOCHOLITHIASIS: Status: ACTIVE | Noted: 2024-04-03

## 2024-04-04 LAB
ALBUMIN SERPL BCP-MCNC: 3.5 G/DL (ref 3.5–5.2)
ALP SERPL-CCNC: 438 U/L (ref 55–135)
ALT SERPL W/O P-5'-P-CCNC: 552 U/L (ref 10–44)
AST SERPL-CCNC: 203 U/L (ref 10–40)
BILIRUB DIRECT SERPL-MCNC: 3.5 MG/DL (ref 0.1–0.3)
BILIRUB SERPL-MCNC: 5 MG/DL (ref 0.1–1)
PROT SERPL-MCNC: 7.2 G/DL (ref 6–8.4)

## 2024-04-04 PROCEDURE — C1769 GUIDE WIRE: HCPCS | Performed by: INTERNAL MEDICINE

## 2024-04-04 PROCEDURE — 36415 COLL VENOUS BLD VENIPUNCTURE: CPT | Performed by: NURSE PRACTITIONER

## 2024-04-04 PROCEDURE — 25000003 PHARM REV CODE 250: Performed by: NURSE PRACTITIONER

## 2024-04-04 PROCEDURE — 43264 ERCP REMOVE DUCT CALCULI: CPT | Mod: ,,, | Performed by: INTERNAL MEDICINE

## 2024-04-04 PROCEDURE — 80076 HEPATIC FUNCTION PANEL: CPT | Performed by: NURSE PRACTITIONER

## 2024-04-04 PROCEDURE — 99223 1ST HOSP IP/OBS HIGH 75: CPT | Mod: ,,, | Performed by: INTERNAL MEDICINE

## 2024-04-04 PROCEDURE — D9220A PRA ANESTHESIA: Mod: CRNA,,, | Performed by: NURSE ANESTHETIST, CERTIFIED REGISTERED

## 2024-04-04 PROCEDURE — 43262 ENDO CHOLANGIOPANCREATOGRAPH: CPT | Performed by: INTERNAL MEDICINE

## 2024-04-04 PROCEDURE — 63600175 PHARM REV CODE 636 W HCPCS: Performed by: NURSE ANESTHETIST, CERTIFIED REGISTERED

## 2024-04-04 PROCEDURE — 43264 ERCP REMOVE DUCT CALCULI: CPT | Performed by: INTERNAL MEDICINE

## 2024-04-04 PROCEDURE — 37000009 HC ANESTHESIA EA ADD 15 MINS: Performed by: INTERNAL MEDICINE

## 2024-04-04 PROCEDURE — 63600175 PHARM REV CODE 636 W HCPCS: Performed by: NURSE PRACTITIONER

## 2024-04-04 PROCEDURE — 74328 X-RAY BILE DUCT ENDOSCOPY: CPT | Mod: TC | Performed by: INTERNAL MEDICINE

## 2024-04-04 PROCEDURE — 27201674 HC SPHINCTERTOME: Performed by: INTERNAL MEDICINE

## 2024-04-04 PROCEDURE — D9220A PRA ANESTHESIA: Mod: ANES,,, | Performed by: ANESTHESIOLOGY

## 2024-04-04 PROCEDURE — 27202125 HC BALLOON, EXTRACTION (ANY): Performed by: INTERNAL MEDICINE

## 2024-04-04 PROCEDURE — 25500020 PHARM REV CODE 255: Performed by: INTERNAL MEDICINE

## 2024-04-04 PROCEDURE — 25000003 PHARM REV CODE 250: Performed by: NURSE ANESTHETIST, CERTIFIED REGISTERED

## 2024-04-04 PROCEDURE — 43262 ENDO CHOLANGIOPANCREATOGRAPH: CPT | Mod: 51,,, | Performed by: INTERNAL MEDICINE

## 2024-04-04 PROCEDURE — 11000001 HC ACUTE MED/SURG PRIVATE ROOM

## 2024-04-04 PROCEDURE — 37000008 HC ANESTHESIA 1ST 15 MINUTES: Performed by: INTERNAL MEDICINE

## 2024-04-04 PROCEDURE — 74328 X-RAY BILE DUCT ENDOSCOPY: CPT | Mod: 26,,, | Performed by: INTERNAL MEDICINE

## 2024-04-04 PROCEDURE — 25000003 PHARM REV CODE 250: Performed by: INTERNAL MEDICINE

## 2024-04-04 RX ORDER — DEXMEDETOMIDINE HYDROCHLORIDE 100 UG/ML
INJECTION, SOLUTION INTRAVENOUS
Status: DISCONTINUED | OUTPATIENT
Start: 2024-04-04 | End: 2024-04-04

## 2024-04-04 RX ORDER — INDOMETHACIN 50 MG/1
SUPPOSITORY RECTAL
Status: COMPLETED | OUTPATIENT
Start: 2024-04-04 | End: 2024-04-04

## 2024-04-04 RX ORDER — HYDROMORPHONE HYDROCHLORIDE 1 MG/ML
0.2 INJECTION, SOLUTION INTRAMUSCULAR; INTRAVENOUS; SUBCUTANEOUS EVERY 5 MIN PRN
Status: DISCONTINUED | OUTPATIENT
Start: 2024-04-04 | End: 2024-04-04 | Stop reason: HOSPADM

## 2024-04-04 RX ORDER — ONDANSETRON HYDROCHLORIDE 2 MG/ML
4 INJECTION, SOLUTION INTRAVENOUS DAILY PRN
Status: DISCONTINUED | OUTPATIENT
Start: 2024-04-04 | End: 2024-04-04 | Stop reason: HOSPADM

## 2024-04-04 RX ORDER — PROCHLORPERAZINE EDISYLATE 5 MG/ML
5 INJECTION INTRAMUSCULAR; INTRAVENOUS EVERY 30 MIN PRN
Status: DISCONTINUED | OUTPATIENT
Start: 2024-04-04 | End: 2024-04-04 | Stop reason: HOSPADM

## 2024-04-04 RX ORDER — LIDOCAINE HYDROCHLORIDE 20 MG/ML
INJECTION INTRAVENOUS
Status: DISCONTINUED | OUTPATIENT
Start: 2024-04-04 | End: 2024-04-04

## 2024-04-04 RX ORDER — PROPOFOL 10 MG/ML
VIAL (ML) INTRAVENOUS CONTINUOUS PRN
Status: DISCONTINUED | OUTPATIENT
Start: 2024-04-04 | End: 2024-04-04

## 2024-04-04 RX ADMIN — PROPOFOL 120 MG: 10 INJECTION, EMULSION INTRAVENOUS at 02:04

## 2024-04-04 RX ADMIN — LIDOCAINE HYDROCHLORIDE 50 MG: 20 INJECTION INTRAVENOUS at 01:04

## 2024-04-04 RX ADMIN — PIPERACILLIN SODIUM AND TAZOBACTAM SODIUM 4.5 G: 4; .5 INJECTION, POWDER, FOR SOLUTION INTRAVENOUS at 05:04

## 2024-04-04 RX ADMIN — SODIUM CHLORIDE: 0.9 INJECTION, SOLUTION INTRAVENOUS at 01:04

## 2024-04-04 RX ADMIN — PROPOFOL 50 MG: 10 INJECTION, EMULSION INTRAVENOUS at 02:04

## 2024-04-04 RX ADMIN — GLYCOPYRROLATE 0.2 MG: 0.2 INJECTION, SOLUTION INTRAMUSCULAR; INTRAVENOUS at 01:04

## 2024-04-04 RX ADMIN — DEXMEDETOMIDINE 16 MCG: 100 INJECTION, SOLUTION, CONCENTRATE INTRAVENOUS at 01:04

## 2024-04-04 RX ADMIN — DEXMEDETOMIDINE 4 MCG: 100 INJECTION, SOLUTION, CONCENTRATE INTRAVENOUS at 02:04

## 2024-04-04 RX ADMIN — PROPOFOL 175 MCG/KG/MIN: 10 INJECTION, EMULSION INTRAVENOUS at 01:04

## 2024-04-04 NOTE — H&P
[facility]  [facility] Medicine  History & Physical    Patient Name: Efra Felipe  MRN: [id number]  Patient Class: IP- Inpatient  Admission Date: 4/3/2024  Attending Physician: Pasquale Sanchez MD   Primary Care Provider: Noelle Primary Doctor         Patient information was obtained from patient, past medical records, and ER records.     Subjective:     Principal Problem:Choledocholithiasis    Chief Complaint:   Chief Complaint   Patient presents with    abnormal labs     Pt refused care for further evaluation and possible surgery for gall stones by leaving ama. States I'm back for my surgery.         HPI: Efra Felipe is a 48 y.o. male with a PMH  has no past medical history on file.  Presented back to the ER for evaluation and treatment of right upper quadrant/right flank pain.  Patient was seen in the ER last night and was offered admission, but left AMA.  Workup revealed choledocholithiasis.  GI was consulted and recommended ERCP.  Reports pain in his right upper quadrant/flank area as a dull/pressure-like sensation that radiates into his right shoulder.  Denies any specific alleviating or aggravating factors.  Denies any other associated symptoms such as fever, aches, chills, sweats, nausea, vomiting, diarrhea, chest pain, palpitation, shortness for breath, dyspnea exertion, lower abdominal pain common bladder/bowel complaints, or any other symptoms at this time.    ER workup revealed ALP of 459, total bilirubin 4.5, AST/ALT of 279/685, and lipase level of 38.  CTA abdomen and pelvis revealed:[Cholelithiasis.  Dilated common bile duct with multiple apparent calculi in the distal extrahepatic common bile duct suggesting choledocholithiasis].  GI notified patient has returned to ER.  Recommend Hospital Medicine to admit, make NPO, initiate antibiotics, and plan to have ERCP performed as it come and go at [facility] tomorrow.  Patient in agreement with treatment plan.  Patient will be  admitted under inpatient status.    PCP: No, Primary Doctor    History reviewed. No pertinent past medical history.    Past Surgical History:   Procedure Laterality Date    right hand surgery         Review of patient's allergies indicates:   Allergen Reactions    Iodine     Iodine and iodide containing products Itching    Shellfish containing products Itching       Current Facility-Administered Medications on File Prior to Encounter   Medication    [DISCONTINUED] piperacillin-tazobactam (ZOSYN) 4.5 g in dextrose 5 % in water (D5W) 100 mL IVPB (MB+)     Current Outpatient Medications on File Prior to Encounter   Medication Sig    methylPREDNISolone (MEDROL DOSEPACK) 4 mg tablet Take as directed on pkg    naproxen (NAPROSYN) 500 MG tablet Take 1 tablet (500 mg total) by mouth 2 (two) times daily with meals.    pantoprazole (PROTONIX) 20 MG tablet Take 2 tablets (40 mg total) by mouth once daily.     Family History       Problem Relation (Age of Onset)    No Known Problems Mother, Father          Tobacco Use    Smoking status: Every Day     Current packs/day: 1.00     Types: Cigarettes    Smokeless tobacco: Not on file   Substance and Sexual Activity    Alcohol use: No    Drug use: No    Sexual activity: Not on file     Review of Systems   Constitutional:  Negative for chills, diaphoresis, fatigue and fever.   Gastrointestinal:  Positive for abdominal pain. Negative for diarrhea, nausea and vomiting.   Genitourinary:  Positive for flank pain. Negative for dysuria and hematuria.   Musculoskeletal:  Positive for back pain. Negative for myalgias.   All other systems reviewed and are negative.    Objective:     Vital Signs (Most Recent):  Temp: 98.2 °F (36.8 °C) (04/03/24 2036)  Pulse: (!) 55 (04/03/24 2036)  Resp: 18 (04/03/24 2036)  BP: (!) 151/80 (04/03/24 2036)  SpO2: 100 % (04/03/24 2036) Vital Signs (24h Range):  Temp:  [98.2 °F (36.8 °C)] 98.2 °F (36.8 °C)  Pulse:  [55-65] 55  Resp:  [18] 18  SpO2:  [98 %-100 %]  100 %  BP: (120-151)/(80-83) 151/80     Weight: 104.6 kg (230 lb 9.6 oz)  Body mass index is 31.28 kg/m².     Physical Exam  Vitals and nursing note reviewed.   Constitutional:       General: He is awake. He is not in acute distress.     Appearance: Normal appearance. He is well-developed and well-groomed. He is not ill-appearing, toxic-appearing or diaphoretic.   HENT:      Head: Normocephalic and atraumatic.   Eyes:      Extraocular Movements: Extraocular movements intact.      Pupils: Pupils are equal, round, and reactive to light.      Comments: Jaundice appearing sclera noted bilaterally   Cardiovascular:      Rate and Rhythm: Normal rate and regular rhythm.      Pulses: Normal pulses.      Heart sounds: Normal heart sounds. No murmur heard.  Pulmonary:      Effort: Pulmonary effort is normal.      Breath sounds: Normal breath sounds.   Abdominal:      General: Bowel sounds are normal.      Palpations: Abdomen is soft.      Tenderness: There is abdominal tenderness in the right upper quadrant. There is no right CVA tenderness, left CVA tenderness, guarding or rebound.   Musculoskeletal:      Cervical back: Normal range of motion and neck supple.      Right lower leg: No edema.      Left lower leg: No edema.      Comments: 5/5 strength throughout   Skin:     General: Skin is warm and dry.      Capillary Refill: Capillary refill takes less than 2 seconds.   Neurological:      General: No focal deficit present.      Mental Status: He is alert and oriented to person, place, and time. Mental status is at baseline.      GCS: GCS eye subscore is 4. GCS verbal subscore is 5. GCS motor subscore is 6.   Psychiatric:         Mood and Affect: Mood normal.         Behavior: Behavior normal. Behavior is cooperative.              CRANIAL NERVES     CN III, IV, VI   Pupils are equal, round, and reactive to light.       LABS:  Recent Results (from the past 24 hour(s))   Comprehensive metabolic panel    Collection Time: 04/03/24   1:04 PM   Result Value Ref Range    Sodium 138 136 - 145 mmol/L    Potassium 4.1 3.5 - 5.1 mmol/L    Chloride 105 95 - 110 mmol/L    CO2 21 (L) 23 - 29 mmol/L    Glucose 100 70 - 110 mg/dL    BUN 12 6 - 20 mg/dL    Creatinine 1.1 0.5 - 1.4 mg/dL    Calcium 9.7 8.7 - 10.5 mg/dL    Total Protein 8.0 6.0 - 8.4 g/dL    Albumin 3.8 3.5 - 5.2 g/dL    Total Bilirubin 4.5 (H) 0.1 - 1.0 mg/dL    Alkaline Phosphatase 459 (H) 55 - 135 U/L     (H) 10 - 40 U/L     (H) 10 - 44 U/L    eGFR >60 >60 mL/min/1.73 m^2    Anion Gap 12 8 - 16 mmol/L   CBC auto differential    Collection Time: 04/03/24  1:04 PM   Result Value Ref Range    WBC 5.87 3.90 - 12.70 K/uL    RBC 4.82 4.60 - 6.20 M/uL    Hemoglobin 14.8 14.0 - 18.0 g/dL    Hematocrit 43.5 40.0 - 54.0 %    MCV 90 82 - 98 fL    MCH 30.7 27.0 - 31.0 pg    MCHC 34.0 32.0 - 36.0 g/dL    RDW 15.1 (H) 11.5 - 14.5 %    Platelets 240 150 - 450 K/uL    MPV 10.5 9.2 - 12.9 fL    Immature Granulocytes 0.3 0.0 - 0.5 %    Gran # (ANC) 2.6 1.8 - 7.7 K/uL    Immature Grans (Abs) 0.02 0.00 - 0.04 K/uL    Lymph # 2.5 1.0 - 4.8 K/uL    Mono # 0.4 0.3 - 1.0 K/uL    Eos # 0.3 0.0 - 0.5 K/uL    Baso # 0.04 0.00 - 0.20 K/uL    nRBC 0 0 /100 WBC    Gran % 45.0 38.0 - 73.0 %    Lymph % 42.1 18.0 - 48.0 %    Mono % 7.3 4.0 - 15.0 %    Eosinophil % 4.6 0.0 - 8.0 %    Basophil % 0.7 0.0 - 1.9 %    Differential Method Automated    Lipase    Collection Time: 04/03/24  1:04 PM   Result Value Ref Range    Lipase 38 4 - 60 U/L       RADIOLOGY  CT Abdomen Pelvis  Without Contrast    Result Date: 4/3/2024  EXAMINATION: CT ABDOMEN PELVIS WITHOUT CONTRAST CLINICAL HISTORY: Flank pain, kidney stone suspected; TECHNIQUE: Low dose axial images, sagittal and coronal reformations were obtained from the lung bases to the pubic symphysis without IV contrast.  Oral contrast was not administered. COMPARISON: CT 09/09/2022 FINDINGS: There is a bandlike opacity at the right lung base suggesting atelectasis or  scarring.  No significant pleural fluid at the visualized lung bases.  The visualized portions of the heart appear normal. The liver is not significantly enlarged.  No focal hepatic abnormality appreciated on this limited noncontrast examination.  There are numerous calculi within the gallbladder lumen.  The gallbladder is not significantly distended.  There is dilatation of the extrahepatic common bile duct measuring up to 1.5 cm in diameter.  There appear to be multiple calculi identified within the distal extrahepatic common bile duct suggesting choledocholithiasis. Stomach is mildly distended presumably with recently ingested oral contents.  The spleen, pancreas, and adrenal glands appear within normal limits. The kidneys are normal in size and location. There is no evidence of hydronephrosis or nephrolithiasis.  The urinary bladder demonstrates no significant abnormality. Prostate is enlarged. The abdominal aorta is normal in course and caliber without significant atherosclerotic calcifications. The visualized loops of small and large bowel show no evidence of obstruction or inflammation.  There is moderate retained stool throughout the colon which may relate to constipation.  No CT evidence of acute appendicitis.  There is redemonstration of nonspecific fat stranding epicentered about the mesenteric root.  There are multiple enlarged mesenteric lymph nodes identified throughout the central mesentery.  These appear mildly increased in size compared to prior examination, noting a significantly enlarged 1.3 cm short axis mesenteric lymph node (axial series 2, image 105).  There is no free intraperitoneal air or portal venous gas. There are bilateral L4 pars interarticularis defects.  There is grade 2 anterolisthesis of L4 on L5 with advanced endplate degenerative changes.  The extraperitoneal soft tissues are unremarkable.     Cholelithiasis.  Dilated common bile duct with multiple apparent calculi in the distal  extrahepatic common bile duct suggesting choledocholithiasis. Findings suggestive of renetta mesentery as detailed above, noting associated mesenteric lymph node enlargement appears increased from prior examination.  This is of uncertain clinical significance.  This is commonly associated with benign etiologies including mesenteric panniculitis, chronic non-specific inflammation, impaired lymphatic drainage, or idiopathic etiology with neoplastic process not excluded Additional findings as above. This report was flagged in Epic as abnormal. Electronically signed by: Martin Carter MD Date:    04/03/2024 Time:    01:43    X-Ray Ankle Complete Right    Result Date: 4/3/2024  EXAMINATION: XR ANKLE COMPLETE 3 VIEW RIGHT CLINICAL HISTORY: Edema, unspecified TECHNIQUE: AP, lateral, and oblique images of the right ankle were performed. COMPARISON: None FINDINGS: No fracture or dislocation.  Ankle mortise is symmetric.  Talar dome is maintained.  Soft tissues are unremarkable.     No acute fracture. Electronically signed by: Rajinder Luna MD Date:    04/03/2024 Time:    00:09      EKG    MICROBIOLOGY    MDM     Amount and/or Complexity of Data Reviewed  Clinical lab tests: reviewed  Tests in the radiology section of CPT®: reviewed  Tests in the medicine section of CPT®: reviewed  Discussion of test results with the performing providers: yes  Decide to obtain previous medical records or to obtain history from someone other than the patient: yes  Obtain history from someone other than the patient: yes  Review and summarize past medical records: yes  Discuss the patient with other providers: yes  Independent visualization of images, tracings, or specimens: yes        Assessment/Plan:     * Choledocholithiasis  Plan:  -NPO  -IVFs  -Analgesics prn  -Antiemetics prn  -IV Abx  -Avoid hepatotoxic agents  -GI consulted  -Plan for ERCP tomorrow  -repeat Hepatic panel in am        VTE Risk Mitigation (From admission, onward)            Ordered     Reason for No Pharmacological VTE Prophylaxis  Once        Comments: Planned surgical procedure   Question:  Reasons:  Answer:  Physician Provided (leave comment)    04/03/24 1943     IP VTE LOW RISK PATIENT  Once         04/03/24 1943     Place sequential compression device  Until discontinued         04/03/24 1943                  //Core Measures   -DVT proph: SCDs, withholding anticoagulation pending surgical intervention   -Code status Full    -Surrogate:none present    Components of this note were documented using a voice recognition system and are subject to errors not corrected at the time the document was proof read. Please contact the author for any clarifications.        Cezar Sanchez NP  Department of Hospital Medicine  [facility] - Emergency Dept.

## 2024-04-04 NOTE — SUBJECTIVE & OBJECTIVE
History reviewed. No pertinent past medical history.    Past Surgical History:   Procedure Laterality Date    right hand surgery         Review of patient's allergies indicates:   Allergen Reactions    Iodine     Iodine and iodide containing products Itching    Shellfish containing products Itching       Current Facility-Administered Medications on File Prior to Encounter   Medication    [DISCONTINUED] piperacillin-tazobactam (ZOSYN) 4.5 g in dextrose 5 % in water (D5W) 100 mL IVPB (MB+)     Current Outpatient Medications on File Prior to Encounter   Medication Sig    methylPREDNISolone (MEDROL DOSEPACK) 4 mg tablet Take as directed on pkg    naproxen (NAPROSYN) 500 MG tablet Take 1 tablet (500 mg total) by mouth 2 (two) times daily with meals.    pantoprazole (PROTONIX) 20 MG tablet Take 2 tablets (40 mg total) by mouth once daily.     Family History       Problem Relation (Age of Onset)    No Known Problems Mother, Father          Tobacco Use    Smoking status: Every Day     Current packs/day: 1.00     Types: Cigarettes    Smokeless tobacco: Not on file   Substance and Sexual Activity    Alcohol use: No    Drug use: No    Sexual activity: Not on file     Review of Systems   Constitutional:  Negative for chills, diaphoresis, fatigue and fever.   Gastrointestinal:  Positive for abdominal pain. Negative for diarrhea, nausea and vomiting.   Genitourinary:  Positive for flank pain. Negative for dysuria and hematuria.   Musculoskeletal:  Positive for back pain. Negative for myalgias.   All other systems reviewed and are negative.    Objective:     Vital Signs (Most Recent):  Temp: 98.2 °F (36.8 °C) (04/03/24 2036)  Pulse: (!) 55 (04/03/24 2036)  Resp: 18 (04/03/24 2036)  BP: (!) 151/80 (04/03/24 2036)  SpO2: 100 % (04/03/24 2036) Vital Signs (24h Range):  Temp:  [98.2 °F (36.8 °C)] 98.2 °F (36.8 °C)  Pulse:  [55-65] 55  Resp:  [18] 18  SpO2:  [98 %-100 %] 100 %  BP: (120-151)/(80-83) 151/80     Weight: 104.6 kg (230 lb  9.6 oz)  Body mass index is 31.28 kg/m².     Physical Exam  Vitals and nursing note reviewed.   Constitutional:       General: He is awake. He is not in acute distress.     Appearance: Normal appearance. He is well-developed and well-groomed. He is not ill-appearing, toxic-appearing or diaphoretic.   HENT:      Head: Normocephalic and atraumatic.   Eyes:      Extraocular Movements: Extraocular movements intact.      Pupils: Pupils are equal, round, and reactive to light.      Comments: Jaundice appearing sclera noted bilaterally   Cardiovascular:      Rate and Rhythm: Normal rate and regular rhythm.      Pulses: Normal pulses.      Heart sounds: Normal heart sounds. No murmur heard.  Pulmonary:      Effort: Pulmonary effort is normal.      Breath sounds: Normal breath sounds.   Abdominal:      General: Bowel sounds are normal.      Palpations: Abdomen is soft.      Tenderness: There is abdominal tenderness in the right upper quadrant. There is no right CVA tenderness, left CVA tenderness, guarding or rebound.   Musculoskeletal:      Cervical back: Normal range of motion and neck supple.      Right lower leg: No edema.      Left lower leg: No edema.      Comments: 5/5 strength throughout   Skin:     General: Skin is warm and dry.      Capillary Refill: Capillary refill takes less than 2 seconds.   Neurological:      General: No focal deficit present.      Mental Status: He is alert and oriented to person, place, and time. Mental status is at baseline.      GCS: GCS eye subscore is 4. GCS verbal subscore is 5. GCS motor subscore is 6.   Psychiatric:         Mood and Affect: Mood normal.         Behavior: Behavior normal. Behavior is cooperative.              CRANIAL NERVES     CN III, IV, VI   Pupils are equal, round, and reactive to light.       LABS:  Recent Results (from the past 24 hour(s))   Comprehensive metabolic panel    Collection Time: 04/03/24  1:04 PM   Result Value Ref Range    Sodium 138 136 - 145 mmol/L     Potassium 4.1 3.5 - 5.1 mmol/L    Chloride 105 95 - 110 mmol/L    CO2 21 (L) 23 - 29 mmol/L    Glucose 100 70 - 110 mg/dL    BUN 12 6 - 20 mg/dL    Creatinine 1.1 0.5 - 1.4 mg/dL    Calcium 9.7 8.7 - 10.5 mg/dL    Total Protein 8.0 6.0 - 8.4 g/dL    Albumin 3.8 3.5 - 5.2 g/dL    Total Bilirubin 4.5 (H) 0.1 - 1.0 mg/dL    Alkaline Phosphatase 459 (H) 55 - 135 U/L     (H) 10 - 40 U/L     (H) 10 - 44 U/L    eGFR >60 >60 mL/min/1.73 m^2    Anion Gap 12 8 - 16 mmol/L   CBC auto differential    Collection Time: 04/03/24  1:04 PM   Result Value Ref Range    WBC 5.87 3.90 - 12.70 K/uL    RBC 4.82 4.60 - 6.20 M/uL    Hemoglobin 14.8 14.0 - 18.0 g/dL    Hematocrit 43.5 40.0 - 54.0 %    MCV 90 82 - 98 fL    MCH 30.7 27.0 - 31.0 pg    MCHC 34.0 32.0 - 36.0 g/dL    RDW 15.1 (H) 11.5 - 14.5 %    Platelets 240 150 - 450 K/uL    MPV 10.5 9.2 - 12.9 fL    Immature Granulocytes 0.3 0.0 - 0.5 %    Gran # (ANC) 2.6 1.8 - 7.7 K/uL    Immature Grans (Abs) 0.02 0.00 - 0.04 K/uL    Lymph # 2.5 1.0 - 4.8 K/uL    Mono # 0.4 0.3 - 1.0 K/uL    Eos # 0.3 0.0 - 0.5 K/uL    Baso # 0.04 0.00 - 0.20 K/uL    nRBC 0 0 /100 WBC    Gran % 45.0 38.0 - 73.0 %    Lymph % 42.1 18.0 - 48.0 %    Mono % 7.3 4.0 - 15.0 %    Eosinophil % 4.6 0.0 - 8.0 %    Basophil % 0.7 0.0 - 1.9 %    Differential Method Automated    Lipase    Collection Time: 04/03/24  1:04 PM   Result Value Ref Range    Lipase 38 4 - 60 U/L       RADIOLOGY  CT Abdomen Pelvis  Without Contrast    Result Date: 4/3/2024  EXAMINATION: CT ABDOMEN PELVIS WITHOUT CONTRAST CLINICAL HISTORY: Flank pain, kidney stone suspected; TECHNIQUE: Low dose axial images, sagittal and coronal reformations were obtained from the lung bases to the pubic symphysis without IV contrast.  Oral contrast was not administered. COMPARISON: CT 09/09/2022 FINDINGS: There is a bandlike opacity at the right lung base suggesting atelectasis or scarring.  No significant pleural fluid at the visualized lung  bases.  The visualized portions of the heart appear normal. The liver is not significantly enlarged.  No focal hepatic abnormality appreciated on this limited noncontrast examination.  There are numerous calculi within the gallbladder lumen.  The gallbladder is not significantly distended.  There is dilatation of the extrahepatic common bile duct measuring up to 1.5 cm in diameter.  There appear to be multiple calculi identified within the distal extrahepatic common bile duct suggesting choledocholithiasis. Stomach is mildly distended presumably with recently ingested oral contents.  The spleen, pancreas, and adrenal glands appear within normal limits. The kidneys are normal in size and location. There is no evidence of hydronephrosis or nephrolithiasis.  The urinary bladder demonstrates no significant abnormality. Prostate is enlarged. The abdominal aorta is normal in course and caliber without significant atherosclerotic calcifications. The visualized loops of small and large bowel show no evidence of obstruction or inflammation.  There is moderate retained stool throughout the colon which may relate to constipation.  No CT evidence of acute appendicitis.  There is redemonstration of nonspecific fat stranding epicentered about the mesenteric root.  There are multiple enlarged mesenteric lymph nodes identified throughout the central mesentery.  These appear mildly increased in size compared to prior examination, noting a significantly enlarged 1.3 cm short axis mesenteric lymph node (axial series 2, image 105).  There is no free intraperitoneal air or portal venous gas. There are bilateral L4 pars interarticularis defects.  There is grade 2 anterolisthesis of L4 on L5 with advanced endplate degenerative changes.  The extraperitoneal soft tissues are unremarkable.     Cholelithiasis.  Dilated common bile duct with multiple apparent calculi in the distal extrahepatic common bile duct suggesting choledocholithiasis.  Findings suggestive of renetta mesentery as detailed above, noting associated mesenteric lymph node enlargement appears increased from prior examination.  This is of uncertain clinical significance.  This is commonly associated with benign etiologies including mesenteric panniculitis, chronic non-specific inflammation, impaired lymphatic drainage, or idiopathic etiology with neoplastic process not excluded Additional findings as above. This report was flagged in Epic as abnormal. Electronically signed by: Martin Carter MD Date:    04/03/2024 Time:    01:43    X-Ray Ankle Complete Right    Result Date: 4/3/2024  EXAMINATION: XR ANKLE COMPLETE 3 VIEW RIGHT CLINICAL HISTORY: Edema, unspecified TECHNIQUE: AP, lateral, and oblique images of the right ankle were performed. COMPARISON: None FINDINGS: No fracture or dislocation.  Ankle mortise is symmetric.  Talar dome is maintained.  Soft tissues are unremarkable.     No acute fracture. Electronically signed by: Rajinder Luna MD Date:    04/03/2024 Time:    00:09      EKG    MICROBIOLOGY    MDM     Amount and/or Complexity of Data Reviewed  Clinical lab tests: reviewed  Tests in the radiology section of CPT®: reviewed  Tests in the medicine section of CPT®: reviewed  Discussion of test results with the performing providers: yes  Decide to obtain previous medical records or to obtain history from someone other than the patient: yes  Obtain history from someone other than the patient: yes  Review and summarize past medical records: yes  Discuss the patient with other providers: yes  Independent visualization of images, tracings, or specimens: yes

## 2024-04-04 NOTE — ASSESSMENT & PLAN NOTE
Imaging and labs suggestive of choledocholithiasis with obstruction. Patient will go to [place] today for ERCP and return after.   He will need general surgery consult this admit.   Continue antibiotics.   Pain meds and antiemetics as needed.   Continue supportive care.

## 2024-04-04 NOTE — PROVATION PATIENT INSTRUCTIONS
Discharge Summary/Instructions after an Endoscopic Procedure  Patient Name: Efra Felipe  Patient MRN: [id number]  Patient YOB: 1975 Thursday, April 4, 2024  Ayad Segovia MD  Dear patient,  As a result of recent federal legislation (The Federal Cures Act), you may   receive lab or pathology results from your procedure in your [facility]   account before your physician is able to contact you. Your physician or   their representative will relay the results to you with their   recommendations at their soonest availability.  Thank you,  RESTRICTIONS:  During your procedure today, you received medications for sedation.  These   medications may affect your judgment, balance and coordination.  Therefore,   for 24 hours, you have the following restrictions:   - DO NOT drive a car, operate machinery, make legal/financial decisions,   sign important papers or drink alcohol.    ACTIVITY:  Today: no heavy lifting, straining or running due to procedural   sedation/anesthesia.  The following day: return to full activity including work.  DIET:  Eat and drink normally unless instructed otherwise.     TREATMENT FOR COMMON SIDE EFFECTS:  - Mild abdominal pain, nausea, belching, bloating or excessive gas:  rest,   eat lightly and use a heating pad.  - Sore Throat: treat with throat lozenges and/or gargle with warm salt   water.  - Because air was used during the procedure, expelling large amounts of air   from your rectum or belching is normal.  - If a bowel prep was taken, you may not have a bowel movement for 1-3 days.    This is normal.  SYMPTOMS TO WATCH FOR AND REPORT TO YOUR PHYSICIAN:  1. Abdominal pain or bloating, other than gas cramps.  2. Chest pain.  3. Back pain.  4. Signs of infection such as: chills or fever occurring within 24 hours   after the procedure.  5. Rectal bleeding, which would show as bright red, maroon, or black stools.   (A tablespoon of blood from the rectum is not serious, especially if    hemorrhoids are present.)  6. Vomiting.  7. Weakness or dizziness.  GO DIRECTLY TO THE NEAREST EMERGENCY ROOM IF YOU HAVE ANY OF THE FOLLOWING:      Difficulty breathing              Chills and/or fever over 101 F   Persistent vomiting and/or vomiting blood   Severe abdominal pain   Severe chest pain   Black, tarry stools   Bleeding- more than one tablespoon   Any other symptom or condition that you feel may need urgent attention  Your doctor recommends these additional instructions:  If any biopsies were taken, your doctors clinic will contact you in 1 to 2   weeks with any results.  - Return patient to referring hospital for ongoing care.   - Recommend surgical consult for cholecystectomy.  For questions, problems or results please call your physician - Ayad Segovia MD at Work:  [phone].  [facility], EMERGENCY ROOM PHONE NUMBER: [phone]  IF A COMPLICATION OR EMERGENCY SITUATION ARISES AND YOU ARE UNABLE TO REACH   YOUR PHYSICIAN - GO DIRECTLY TO THE EMERGENCY ROOM.  Ayad Segovia MD  4/4/2024 2:22:02 PM  This report has been verified and signed electronically.  Dear patient,  As a result of recent federal legislation (The Federal Cures Act), you may   receive lab or pathology results from your procedure in your [facility]   account before your physician is able to contact you. Your physician or   their representative will relay the results to you with their   recommendations at their soonest availability.  Thank you,  PROVATION

## 2024-04-04 NOTE — HPI
The patient presented to the ER for dark urine. He has been having right sided abd/flank pain intermittently for several months. He decided to come to the ER when his urine turned dark. He has had some mild nausea but no vomiting. Pain currently controlled. Work up was positive for choledocholithiasis. Admission was recommended but he left AMA. He returned again last night. T. Bili has increased from 3.0 to 5.0. D. Bili 3.5, , , . Non-con CT yesterday showed numerous calculi within the gallbladder lumen. There is dilatation of the extrahepatic common bile duct measuring up to 1.5 cm in diameter. There appear to be multiple calculi identified within the distal extrahepatic common bile duct suggesting choledocholithiasis. There is moderate retained stool throughout the colon which may relate to constipation. There are multiple enlarged mesenteric lymph nodes identified throughout the central mesentery. These appear mildly increased in size compared to prior examination, noting a significantly enlarged 1.3 cm short axis mesenteric lymph node.

## 2024-04-04 NOTE — ASSESSMENT & PLAN NOTE
Plan:  -NPO  -IVFs  -Analgesics prn  -Antiemetics prn  -IV Abx  -Avoid hepatotoxic agents  -GI consulted  -Plan for ERCP tomorrow  -repeat Hepatic panel in am

## 2024-04-04 NOTE — H&P (VIEW-ONLY)
[facility] - Emergency Dept.  Gastroenterology  Consult Note    Patient Name: Efra Felipe  MRN: [id number]  Admission Date: 4/3/2024  Hospital Length of Stay: 1 days  Code Status: Full Code   Attending Provider: Mohit Ocasio MD   Consulting Provider: Geronimo Colunga PA-C  Primary Care Physician: Noelle, Primary Doctor  Principal Problem:Cholelithiasis with choledocholithiasis    Inpatient consult to Gastroenterology  Consult performed by: Geronimo Colunga PA-C  Consult ordered by: Mohit Ocasio MD  Reason for consult: Choledocholithiasis        Subjective:     HPI:  The patient presented to the ER for dark urine. He has been having right sided abd/flank pain intermittently for several months. He decided to come to the ER when his urine turned dark. He has had some mild nausea but no vomiting. Pain currently controlled. Work up was positive for choledocholithiasis. Admission was recommended but he left AMA. He returned again last night. T. Bili has increased from 3.0 to 5.0. D. Bili 3.5, , , . Non-con CT yesterday showed numerous calculi within the gallbladder lumen. There is dilatation of the extrahepatic common bile duct measuring up to 1.5 cm in diameter. There appear to be multiple calculi identified within the distal extrahepatic common bile duct suggesting choledocholithiasis. There is moderate retained stool throughout the colon which may relate to constipation. There are multiple enlarged mesenteric lymph nodes identified throughout the central mesentery. These appear mildly increased in size compared to prior examination, noting a significantly enlarged 1.3 cm short axis mesenteric lymph node.     History reviewed. No pertinent past medical history.    Past Surgical History:   Procedure Laterality Date    right hand surgery         Review of patient's allergies indicates:   Allergen Reactions    Iodine     Iodine and iodide containing products Itching    Shellfish containing products Itching     Family  History       Problem Relation (Age of Onset)    No Known Problems Mother, Father          Tobacco Use    Smoking status: Every Day     Current packs/day: 1.00     Types: Cigarettes    Smokeless tobacco: Not on file   Substance and Sexual Activity    Alcohol use: No    Drug use: No    Sexual activity: Not on file     Review of Systems   Constitutional:  Negative for fever.   HENT:  Negative for hearing loss.    Eyes:  Negative for visual disturbance.   Respiratory:  Negative for cough and shortness of breath.    Cardiovascular:  Positive for leg swelling. Negative for chest pain and palpitations.   Gastrointestinal:         As per HPI.   Genitourinary:  Negative for difficulty urinating, dysuria, frequency and hematuria.   Musculoskeletal:  Positive for back pain. Negative for arthralgias.   Skin:  Negative for color change and rash.   Neurological:  Negative for seizures, syncope, numbness and headaches.   Hematological:  Does not bruise/bleed easily.   Psychiatric/Behavioral:  The patient is not nervous/anxious.      Objective:     Vital Signs (Most Recent):  Temp: 98.3 °F (36.8 °C) (04/04/24 0043)  Pulse: (!) 59 (04/04/24 0801)  Resp: 18 (04/04/24 0801)  BP: (!) 114/59 (04/04/24 0801)  SpO2: 98 % (04/04/24 0801) Vital Signs (24h Range):  Temp:  [98.2 °F (36.8 °C)-98.3 °F (36.8 °C)] 98.3 °F (36.8 °C)  Pulse:  [55-80] 59  Resp:  [16-18] 18  SpO2:  [95 %-100 %] 98 %  BP: (102-157)/(52-99) 114/59     Weight: 104.6 kg (230 lb 9.6 oz) (04/03/24 1026)  Body mass index is 31.28 kg/m².      Intake/Output Summary (Last 24 hours) at 4/4/2024 0850  Last data filed at 4/4/2024 0024  Gross per 24 hour   Intake 94.73 ml   Output --   Net 94.73 ml       Lines/Drains/Airways       Peripheral Intravenous Line  Duration                  Peripheral IV - Single Lumen 04/03/24 1305 20 G Right Antecubital <1 day         Peripheral IV - Single Lumen 04/03/24 2019 20 G Left Antecubital <1 day                     Physical  "Exam  Constitutional:       General: He is not in acute distress.     Appearance: Normal appearance. He is well-developed.   HENT:      Head: Normocephalic and atraumatic.   Eyes:      Extraocular Movements: Extraocular movements intact.   Cardiovascular:      Rate and Rhythm: Normal rate and regular rhythm.      Heart sounds: Normal heart sounds. No murmur heard.  Pulmonary:      Effort: Pulmonary effort is normal. No respiratory distress.      Breath sounds: Normal breath sounds. No wheezing.   Abdominal:      General: Bowel sounds are normal. There is no distension.      Palpations: Abdomen is soft. There is no mass.      Tenderness: There is no abdominal tenderness.   Musculoskeletal:      Cervical back: Normal range of motion and neck supple.      Right lower leg: No edema.      Left lower leg: No edema.   Skin:     General: Skin is warm and dry.      Findings: No rash.   Neurological:      Mental Status: He is alert and oriented to person, place, and time.      Cranial Nerves: No cranial nerve deficit.   Psychiatric:         Behavior: Behavior normal.          Significant Labs:  CBC:   Recent Labs   Lab 04/02/24  2217 04/03/24  1304   WBC 6.80 5.87   HGB 14.6 14.8   HCT 42.6 43.5    240     CMP:   Recent Labs   Lab 04/03/24  1304 04/04/24  0701     --    CALCIUM 9.7  --    ALBUMIN 3.8 3.5   PROT 8.0 7.2     --    K 4.1  --    CO2 21*  --      --    BUN 12  --    CREATININE 1.1  --    ALKPHOS 459* 438*   * 552*   * 203*   BILITOT 4.5* 5.0*     Coagulation: No results for input(s): "PT", "INR", "APTT" in the last 48 hours.    Significant Imaging:  Imaging results within the past 24 hours have been reviewed.  Assessment/Plan:     GI  * Cholelithiasis with choledocholithiasis  Imaging and labs suggestive of choledocholithiasis with obstruction. Patient will go to [place] today for ERCP and return after.   He will need general surgery consult this admit.   Continue " antibiotics.   Pain meds and antiemetics as needed.   Continue supportive care.       Thank you for your consult. I will follow-up with patient. Please contact us if you have any additional questions.    Geronimo Colunga PA-C  Gastroenterology  [facility] - Emergency Dept.

## 2024-04-04 NOTE — ED NOTES
Spoke with [facility] at [facility].  The procedure for this patient has yet to be scheduled.  [facility] will call Endo @ 0700 and confirm an appointment time once is has been scheduled and initiate transport for the patient.

## 2024-04-04 NOTE — ANESTHESIA POSTPROCEDURE EVALUATION
Anesthesia Post Evaluation    Patient: Efra Felipe    Procedure(s) Performed: Procedure(s) (LRB):  ERCP (ENDOSCOPIC RETROGRADE CHOLANGIOPANCREATOGRAPHY) (Left)    Final Anesthesia Type: general      Patient location during evaluation: PACU  Patient participation: Yes- Able to Participate  Level of consciousness: awake and alert and oriented  Post-procedure vital signs: reviewed and stable  Pain management: adequate  Airway patency: patent    PONV status at discharge: No PONV  Anesthetic complications: no      Cardiovascular status: hemodynamically stable  Respiratory status: unassisted  Hydration status: euvolemic  Follow-up not needed.              Vitals Value Taken Time   /74 04/04/24 1516   Temp 36.7 °C (98 °F) 04/04/24 1420   Pulse 58 04/04/24 1519   Resp 29 04/04/24 1519   SpO2 98 % 04/04/24 1519   Vitals shown include unvalidated device data.      No case tracking events are documented in the log.      Pain/Ina Score: Ina Score: 9 (4/4/2024  2:20 PM)

## 2024-04-04 NOTE — TRANSFER OF CARE
Anesthesia Transfer of Care Note    Patient: Efra Felipe    Procedure(s) Performed: Procedure(s) (LRB):  ERCP (ENDOSCOPIC RETROGRADE CHOLANGIOPANCREATOGRAPHY) (Left)    Patient location: [facility]    Anesthesia Type: MAC    Transport from OR: Transported from OR on 2-3 L/min O2 by NC with adequate spontaneous ventilation    Post pain: adequate analgesia    Post assessment: no apparent anesthetic complications and tolerated procedure well    Post vital signs: stable    Level of consciousness: sedated    Nausea/Vomiting: no nausea/vomiting    Complications: none    Transfer of care protocol was followed      Last vitals: Visit Vitals  /74 (BP Location: Left arm, Patient Position: Lying)   Pulse 60   Temp 37 °C (98.6 °F)   Resp 16   Ht 6' (1.829 m)   Wt 104.3 kg (230 lb)   SpO2 96%   BMI 31.19 kg/m²

## 2024-04-04 NOTE — PLAN OF CARE
Patient was not seen today as he was transferred to [facility] for ERCP.  Surgery consult on case for possible cholecystectomy.  Okay to resume clear liquid diet once he returns to [facility].  NPO at midnight for surgery tomorrow.

## 2024-04-04 NOTE — PLAN OF CARE
[facility] - Med Surg  Initial Discharge Assessment       Primary Care Provider: No, Primary Doctor    Admission Diagnosis: Choledocholithiasis [K80.50]  Elevated liver enzymes [R74.8]  Flank pain [R10.9]  Chest pain [R07.9]    Admission Date: 4/3/2024  Expected Discharge Date: Per Attending     Transition of Care Barriers: None    Payor: MEDICAID / Plan: [facility] COMMUNITY PLAN [facility] ([place] MEDICAID) / Product Type: Managed Medicaid /     Extended Emergency Contact Information  Primary Emergency Contact: NakulUnique  Mobile Phone: [phone]  Relation: Sister   needed? No  Secondary Emergency Contact: Jaelyn Felipe  Mobile Phone: [phone]  Relation: Sister    Discharge Plan A: Home         [id number] - [place] - [address]  [address] [address]  Phone: [phone] Fax: [phone]      Initial Assessment (most recent)       Adult Discharge Assessment - 04/04/24 1105          Discharge Assessment    Assessment Type Discharge Planning Assessment     Confirmed/corrected address, phone number and insurance Yes     Confirmed Demographics Correct on Facesheet     Source of Information health record     Communicated ELYSIA with patient/caregiver Date not available/Unable to determine     Reason For Admission cholelithiasis with choledocholithiasis     Do you expect to return to your current living situation? Yes     Walking or Climbing Stairs Difficulty no     Dressing/Bathing Difficulty no     Home Layout Able to live on 1st floor     Equipment Currently Used at Home none     Readmission within 30 days? No     Patient currently being followed by outpatient case management? Unable to determine (comments)     Do you currently have service(s) that help you manage your care at home? No     Do you take prescription medications? Yes     Do you have prescription coverage? Yes     Coverage [facility]  medicaid     Do you have any problems affording any of your prescribed medications? No     Who is going to help you get home at discharge? family     How do you get to doctors appointments? car, drives self     Are you on dialysis? No     Do you take coumadin? No     Discharge Plan A Home     DME Needed Upon Discharge  none     Transition of Care Barriers None                   Pt in [place] getting ERCP. Patient has no d/c needs at this time. Sw to follow up, as needed, for d/c planning purposes.

## 2024-04-04 NOTE — CONSULTS
Patient admitted with choledocholithiasis.  Patient not seen today as he is Currently on leave of absence at [facility] for ERCP.  Labs, imaging and workup reviewed on patient's chart  Will tentatively plan for cholecystectomy tomorrow to prevent future occurrences  Please keep patient NPO after midnight in preparation for surgery tomorrow  Will assess patient in a.m. and follow up liver function tests on morning labs  Please call with questions or concerns

## 2024-04-04 NOTE — ANESTHESIA PREPROCEDURE EVALUATION
"                                                                                                             2024  Efra Felipe is a 48 y.o., male.  Pre-operative evaluation for ERCP (ENDOSCOPIC RETROGRADE CHOLANGIOPANCREATOGRAPHY) (Left)    Chief Complaint:choledocolithiasis    PMH:  Smoker- quit 3 years ago  Past Surgical History:   Procedure Laterality Date    right hand surgery           Vital Signs Range (Last 24H):  Temp:  [36.5 °C (97.7 °F)-37 °C (98.6 °F)]   Pulse:  [55-80]   Resp:  [16-18]   BP: (102-157)/(52-99)   SpO2:  [95 %-100 %]       CBC:     Recent Labs   Lab 24  1304   WBC 6.80 5.87   RBC 4.75 4.82   HGB 14.6 14.8   HCT 42.6 43.5    240   MCV 90 90   MCH 30.7 30.7   MCHC 34.3 34.0       CMP:   Recent Labs   Lab 24  1304 24  0701    138  --    K 3.7 4.1  --     105  --    CO2 21* 21*  --    BUN 13 12  --    CREATININE 1.0 1.1  --    * 100  --    CALCIUM 9.6 9.7  --    ALBUMIN 3.8 3.8 3.5   PROT 7.9 8.0 7.2   ALKPHOS 447* 459* 438*   * 685* 552*   * 279* 203*   BILITOT 3.0* 4.5* 5.0*       INR:  No results for input(s): "PT", "INR", "PROTIME", "APTT" in the last 720 hours.      Diagnostic Studies:      EKD Echo:     Pre-op Assessment    I have reviewed the Patient Summary Reports.     I have reviewed the Nursing Notes. I have reviewed the NPO Status.   I have reviewed the Medications.     Review of Systems  Anesthesia Hx:  No problems with previous Anesthesia                Social:  Former Smoker       Cardiovascular:  Cardiovascular Normal                                            Pulmonary:  Pulmonary Normal                       Neurological:  Neurology Normal                                          Physical Exam  General: Well nourished, Cooperative, Alert and Oriented    Airway:  Mallampati: II / I  Mouth Opening: Normal  TM Distance: Normal  Tongue: Normal  Neck ROM: Normal ROM  Neck: " Girth Increased    Dental:  Periodontal disease  Numerous chipped and missing teeth-dnies any loose teeth  Chest/Lungs:  Normal Respiratory Rate        Anesthesia Plan  Type of Anesthesia, risks & benefits discussed:    Anesthesia Type: Gen ETT, Gen Natural Airway  Intra-op Monitoring Plan: Standard ASA Monitors  Post Op Pain Control Plan: multimodal analgesia  Induction:  IV  Informed Consent: Informed consent signed with the Patient and all parties understand the risks and agree with anesthesia plan.  All questions answered.   ASA Score: 2  Day of Surgery Review of History & Physical: H&P Update referred to the surgeon/provider.    Ready For Surgery From Anesthesia Perspective.     .

## 2024-04-04 NOTE — SUBJECTIVE & OBJECTIVE
History reviewed. No pertinent past medical history.    Past Surgical History:   Procedure Laterality Date    right hand surgery         Review of patient's allergies indicates:   Allergen Reactions    Iodine     Iodine and iodide containing products Itching    Shellfish containing products Itching     Family History       Problem Relation (Age of Onset)    No Known Problems Mother, Father          Tobacco Use    Smoking status: Every Day     Current packs/day: 1.00     Types: Cigarettes    Smokeless tobacco: Not on file   Substance and Sexual Activity    Alcohol use: No    Drug use: No    Sexual activity: Not on file     Review of Systems   Constitutional:  Negative for fever.   HENT:  Negative for hearing loss.    Eyes:  Negative for visual disturbance.   Respiratory:  Negative for cough and shortness of breath.    Cardiovascular:  Positive for leg swelling. Negative for chest pain and palpitations.   Gastrointestinal:         As per HPI.   Genitourinary:  Negative for difficulty urinating, dysuria, frequency and hematuria.   Musculoskeletal:  Positive for back pain. Negative for arthralgias.   Skin:  Negative for color change and rash.   Neurological:  Negative for seizures, syncope, numbness and headaches.   Hematological:  Does not bruise/bleed easily.   Psychiatric/Behavioral:  The patient is not nervous/anxious.      Objective:     Vital Signs (Most Recent):  Temp: 98.3 °F (36.8 °C) (04/04/24 0043)  Pulse: (!) 59 (04/04/24 0801)  Resp: 18 (04/04/24 0801)  BP: (!) 114/59 (04/04/24 0801)  SpO2: 98 % (04/04/24 0801) Vital Signs (24h Range):  Temp:  [98.2 °F (36.8 °C)-98.3 °F (36.8 °C)] 98.3 °F (36.8 °C)  Pulse:  [55-80] 59  Resp:  [16-18] 18  SpO2:  [95 %-100 %] 98 %  BP: (102-157)/(52-99) 114/59     Weight: 104.6 kg (230 lb 9.6 oz) (04/03/24 1026)  Body mass index is 31.28 kg/m².      Intake/Output Summary (Last 24 hours) at 4/4/2024 0850  Last data filed at 4/4/2024 0024  Gross per 24 hour   Intake 94.73 ml  "  Output --   Net 94.73 ml       Lines/Drains/Airways       Peripheral Intravenous Line  Duration                  Peripheral IV - Single Lumen 04/03/24 1305 20 G Right Antecubital <1 day         Peripheral IV - Single Lumen 04/03/24 2019 20 G Left Antecubital <1 day                     Physical Exam  Constitutional:       General: He is not in acute distress.     Appearance: Normal appearance. He is well-developed.   HENT:      Head: Normocephalic and atraumatic.   Eyes:      Extraocular Movements: Extraocular movements intact.   Cardiovascular:      Rate and Rhythm: Normal rate and regular rhythm.      Heart sounds: Normal heart sounds. No murmur heard.  Pulmonary:      Effort: Pulmonary effort is normal. No respiratory distress.      Breath sounds: Normal breath sounds. No wheezing.   Abdominal:      General: Bowel sounds are normal. There is no distension.      Palpations: Abdomen is soft. There is no mass.      Tenderness: There is no abdominal tenderness.   Musculoskeletal:      Cervical back: Normal range of motion and neck supple.      Right lower leg: No edema.      Left lower leg: No edema.   Skin:     General: Skin is warm and dry.      Findings: No rash.   Neurological:      Mental Status: He is alert and oriented to person, place, and time.      Cranial Nerves: No cranial nerve deficit.   Psychiatric:         Behavior: Behavior normal.          Significant Labs:  CBC:   Recent Labs   Lab 04/02/24  2217 04/03/24  1304   WBC 6.80 5.87   HGB 14.6 14.8   HCT 42.6 43.5    240     CMP:   Recent Labs   Lab 04/03/24  1304 04/04/24  0701     --    CALCIUM 9.7  --    ALBUMIN 3.8 3.5   PROT 8.0 7.2     --    K 4.1  --    CO2 21*  --      --    BUN 12  --    CREATININE 1.1  --    ALKPHOS 459* 438*   * 552*   * 203*   BILITOT 4.5* 5.0*     Coagulation: No results for input(s): "PT", "INR", "APTT" in the last 48 hours.    Significant Imaging:  Imaging results within the past 24 " hours have been reviewed.

## 2024-04-04 NOTE — CONSULTS
[facility] - Emergency Dept.  Gastroenterology  Consult Note    Patient Name: Efra Felipe  MRN: [id number]  Admission Date: 4/3/2024  Hospital Length of Stay: 1 days  Code Status: Full Code   Attending Provider: Mohit Ocasio MD   Consulting Provider: Geronimo Colunga PA-C  Primary Care Physician: Noelle, Primary Doctor  Principal Problem:Cholelithiasis with choledocholithiasis    Inpatient consult to Gastroenterology  Consult performed by: Geronimo Colunga PA-C  Consult ordered by: Mohit Ocasio MD  Reason for consult: Choledocholithiasis        Subjective:     HPI:  The patient presented to the ER for dark urine. He has been having right sided abd/flank pain intermittently for several months. He decided to come to the ER when his urine turned dark. He has had some mild nausea but no vomiting. Pain currently controlled. Work up was positive for choledocholithiasis. Admission was recommended but he left AMA. He returned again last night. T. Bili has increased from 3.0 to 5.0. D. Bili 3.5, , , . Non-con CT yesterday showed numerous calculi within the gallbladder lumen. There is dilatation of the extrahepatic common bile duct measuring up to 1.5 cm in diameter. There appear to be multiple calculi identified within the distal extrahepatic common bile duct suggesting choledocholithiasis. There is moderate retained stool throughout the colon which may relate to constipation. There are multiple enlarged mesenteric lymph nodes identified throughout the central mesentery. These appear mildly increased in size compared to prior examination, noting a significantly enlarged 1.3 cm short axis mesenteric lymph node.     History reviewed. No pertinent past medical history.    Past Surgical History:   Procedure Laterality Date    right hand surgery         Review of patient's allergies indicates:   Allergen Reactions    Iodine     Iodine and iodide containing products Itching    Shellfish containing products Itching     Family  History       Problem Relation (Age of Onset)    No Known Problems Mother, Father          Tobacco Use    Smoking status: Every Day     Current packs/day: 1.00     Types: Cigarettes    Smokeless tobacco: Not on file   Substance and Sexual Activity    Alcohol use: No    Drug use: No    Sexual activity: Not on file     Review of Systems   Constitutional:  Negative for fever.   HENT:  Negative for hearing loss.    Eyes:  Negative for visual disturbance.   Respiratory:  Negative for cough and shortness of breath.    Cardiovascular:  Positive for leg swelling. Negative for chest pain and palpitations.   Gastrointestinal:         As per HPI.   Genitourinary:  Negative for difficulty urinating, dysuria, frequency and hematuria.   Musculoskeletal:  Positive for back pain. Negative for arthralgias.   Skin:  Negative for color change and rash.   Neurological:  Negative for seizures, syncope, numbness and headaches.   Hematological:  Does not bruise/bleed easily.   Psychiatric/Behavioral:  The patient is not nervous/anxious.      Objective:     Vital Signs (Most Recent):  Temp: 98.3 °F (36.8 °C) (04/04/24 0043)  Pulse: (!) 59 (04/04/24 0801)  Resp: 18 (04/04/24 0801)  BP: (!) 114/59 (04/04/24 0801)  SpO2: 98 % (04/04/24 0801) Vital Signs (24h Range):  Temp:  [98.2 °F (36.8 °C)-98.3 °F (36.8 °C)] 98.3 °F (36.8 °C)  Pulse:  [55-80] 59  Resp:  [16-18] 18  SpO2:  [95 %-100 %] 98 %  BP: (102-157)/(52-99) 114/59     Weight: 104.6 kg (230 lb 9.6 oz) (04/03/24 1026)  Body mass index is 31.28 kg/m².      Intake/Output Summary (Last 24 hours) at 4/4/2024 0850  Last data filed at 4/4/2024 0024  Gross per 24 hour   Intake 94.73 ml   Output --   Net 94.73 ml       Lines/Drains/Airways       Peripheral Intravenous Line  Duration                  Peripheral IV - Single Lumen 04/03/24 1305 20 G Right Antecubital <1 day         Peripheral IV - Single Lumen 04/03/24 2019 20 G Left Antecubital <1 day                     Physical  "Exam  Constitutional:       General: He is not in acute distress.     Appearance: Normal appearance. He is well-developed.   HENT:      Head: Normocephalic and atraumatic.   Eyes:      Extraocular Movements: Extraocular movements intact.   Cardiovascular:      Rate and Rhythm: Normal rate and regular rhythm.      Heart sounds: Normal heart sounds. No murmur heard.  Pulmonary:      Effort: Pulmonary effort is normal. No respiratory distress.      Breath sounds: Normal breath sounds. No wheezing.   Abdominal:      General: Bowel sounds are normal. There is no distension.      Palpations: Abdomen is soft. There is no mass.      Tenderness: There is no abdominal tenderness.   Musculoskeletal:      Cervical back: Normal range of motion and neck supple.      Right lower leg: No edema.      Left lower leg: No edema.   Skin:     General: Skin is warm and dry.      Findings: No rash.   Neurological:      Mental Status: He is alert and oriented to person, place, and time.      Cranial Nerves: No cranial nerve deficit.   Psychiatric:         Behavior: Behavior normal.          Significant Labs:  CBC:   Recent Labs   Lab 04/02/24  2217 04/03/24  1304   WBC 6.80 5.87   HGB 14.6 14.8   HCT 42.6 43.5    240     CMP:   Recent Labs   Lab 04/03/24  1304 04/04/24  0701     --    CALCIUM 9.7  --    ALBUMIN 3.8 3.5   PROT 8.0 7.2     --    K 4.1  --    CO2 21*  --      --    BUN 12  --    CREATININE 1.1  --    ALKPHOS 459* 438*   * 552*   * 203*   BILITOT 4.5* 5.0*     Coagulation: No results for input(s): "PT", "INR", "APTT" in the last 48 hours.    Significant Imaging:  Imaging results within the past 24 hours have been reviewed.  Assessment/Plan:     GI  * Cholelithiasis with choledocholithiasis  Imaging and labs suggestive of choledocholithiasis with obstruction. Patient will go to [place] today for ERCP and return after.   He will need general surgery consult this admit.   Continue " antibiotics.   Pain meds and antiemetics as needed.   Continue supportive care.       Thank you for your consult. I will follow-up with patient. Please contact us if you have any additional questions.    Geronimo Colunga PA-C  Gastroenterology  [facility] - Emergency Dept.

## 2024-04-05 ENCOUNTER — ANESTHESIA EVENT (OUTPATIENT)
Dept: SURGERY | Facility: HOSPITAL | Age: 49
DRG: 419 | End: 2024-04-05
Payer: MEDICAID

## 2024-04-05 ENCOUNTER — ANESTHESIA (OUTPATIENT)
Dept: SURGERY | Facility: HOSPITAL | Age: 49
DRG: 419 | End: 2024-04-05
Payer: MEDICAID

## 2024-04-05 LAB
ALBUMIN SERPL BCP-MCNC: 3.6 G/DL (ref 3.5–5.2)
ALBUMIN SERPL BCP-MCNC: 3.7 G/DL (ref 3.5–5.2)
ALP SERPL-CCNC: 410 U/L (ref 55–135)
ALP SERPL-CCNC: 421 U/L (ref 55–135)
ALT SERPL W/O P-5'-P-CCNC: 431 U/L (ref 10–44)
ALT SERPL W/O P-5'-P-CCNC: 445 U/L (ref 10–44)
ANION GAP SERPL CALC-SCNC: 9 MMOL/L (ref 8–16)
AST SERPL-CCNC: 107 U/L (ref 10–40)
AST SERPL-CCNC: 108 U/L (ref 10–40)
BASOPHILS # BLD AUTO: 0.02 K/UL (ref 0–0.2)
BASOPHILS NFR BLD: 0.3 % (ref 0–1.9)
BILIRUB DIRECT SERPL-MCNC: 0.9 MG/DL (ref 0.1–0.3)
BILIRUB SERPL-MCNC: 2.1 MG/DL (ref 0.1–1)
BILIRUB SERPL-MCNC: 2.2 MG/DL (ref 0.1–1)
BUN SERPL-MCNC: 14 MG/DL (ref 6–20)
CALCIUM SERPL-MCNC: 9.5 MG/DL (ref 8.7–10.5)
CHLORIDE SERPL-SCNC: 104 MMOL/L (ref 95–110)
CO2 SERPL-SCNC: 27 MMOL/L (ref 23–29)
CREAT SERPL-MCNC: 1.1 MG/DL (ref 0.5–1.4)
DIFFERENTIAL METHOD BLD: ABNORMAL
EOSINOPHIL # BLD AUTO: 0.4 K/UL (ref 0–0.5)
EOSINOPHIL NFR BLD: 5.2 % (ref 0–8)
ERYTHROCYTE [DISTWIDTH] IN BLOOD BY AUTOMATED COUNT: 14.5 % (ref 11.5–14.5)
EST. GFR  (NO RACE VARIABLE): >60 ML/MIN/1.73 M^2
GLUCOSE SERPL-MCNC: 109 MG/DL (ref 70–110)
HCT VFR BLD AUTO: 42.3 % (ref 40–54)
HGB BLD-MCNC: 14.6 G/DL (ref 14–18)
IMM GRANULOCYTES # BLD AUTO: 0.04 K/UL (ref 0–0.04)
IMM GRANULOCYTES NFR BLD AUTO: 0.6 % (ref 0–0.5)
LYMPHOCYTES # BLD AUTO: 2.5 K/UL (ref 1–4.8)
LYMPHOCYTES NFR BLD: 37.1 % (ref 18–48)
MCH RBC QN AUTO: 30.8 PG (ref 27–31)
MCHC RBC AUTO-ENTMCNC: 34.5 G/DL (ref 32–36)
MCV RBC AUTO: 89 FL (ref 82–98)
MONOCYTES # BLD AUTO: 0.4 K/UL (ref 0.3–1)
MONOCYTES NFR BLD: 6 % (ref 4–15)
NEUTROPHILS # BLD AUTO: 3.5 K/UL (ref 1.8–7.7)
NEUTROPHILS NFR BLD: 50.8 % (ref 38–73)
NRBC BLD-RTO: 0 /100 WBC
PLATELET # BLD AUTO: 240 K/UL (ref 150–450)
PMV BLD AUTO: 10.6 FL (ref 9.2–12.9)
POTASSIUM SERPL-SCNC: 4.4 MMOL/L (ref 3.5–5.1)
PROT SERPL-MCNC: 7.1 G/DL (ref 6–8.4)
PROT SERPL-MCNC: 7.2 G/DL (ref 6–8.4)
RBC # BLD AUTO: 4.74 M/UL (ref 4.6–6.2)
SODIUM SERPL-SCNC: 140 MMOL/L (ref 136–145)
WBC # BLD AUTO: 6.79 K/UL (ref 3.9–12.7)

## 2024-04-05 PROCEDURE — 63600175 PHARM REV CODE 636 W HCPCS: Performed by: ANESTHESIOLOGY

## 2024-04-05 PROCEDURE — 36000711: Performed by: SURGERY

## 2024-04-05 PROCEDURE — 11000001 HC ACUTE MED/SURG PRIVATE ROOM

## 2024-04-05 PROCEDURE — 27201423 OPTIME MED/SURG SUP & DEVICES STERILE SUPPLY: Performed by: SURGERY

## 2024-04-05 PROCEDURE — 25000003 PHARM REV CODE 250: Performed by: NURSE ANESTHETIST, CERTIFIED REGISTERED

## 2024-04-05 PROCEDURE — 71000033 HC RECOVERY, INTIAL HOUR: Performed by: SURGERY

## 2024-04-05 PROCEDURE — 25000003 PHARM REV CODE 250: Performed by: SURGERY

## 2024-04-05 PROCEDURE — 0FT44ZZ RESECTION OF GALLBLADDER, PERCUTANEOUS ENDOSCOPIC APPROACH: ICD-10-PCS | Performed by: SURGERY

## 2024-04-05 PROCEDURE — 25000003 PHARM REV CODE 250: Performed by: NURSE PRACTITIONER

## 2024-04-05 PROCEDURE — 94761 N-INVAS EAR/PLS OXIMETRY MLT: CPT

## 2024-04-05 PROCEDURE — BF53200 OTHER IMAGING OF GALLBLADDER AND BILE DUCTS USING FLUORESCING AGENT, INDOCYANINE GREEN DYE, INTRAOPERATIVE: ICD-10-PCS | Performed by: SURGERY

## 2024-04-05 PROCEDURE — 8E0W4CZ ROBOTIC ASSISTED PROCEDURE OF TRUNK REGION, PERCUTANEOUS ENDOSCOPIC APPROACH: ICD-10-PCS | Performed by: SURGERY

## 2024-04-05 PROCEDURE — 99222 1ST HOSP IP/OBS MODERATE 55: CPT | Mod: 57,,,

## 2024-04-05 PROCEDURE — 63600175 PHARM REV CODE 636 W HCPCS: Performed by: NURSE PRACTITIONER

## 2024-04-05 PROCEDURE — 63600175 PHARM REV CODE 636 W HCPCS: Performed by: NURSE ANESTHETIST, CERTIFIED REGISTERED

## 2024-04-05 PROCEDURE — 80076 HEPATIC FUNCTION PANEL: CPT

## 2024-04-05 PROCEDURE — 80053 COMPREHEN METABOLIC PANEL: CPT | Performed by: FAMILY MEDICINE

## 2024-04-05 PROCEDURE — 85025 COMPLETE CBC W/AUTO DIFF WBC: CPT | Performed by: FAMILY MEDICINE

## 2024-04-05 PROCEDURE — 47562 LAPAROSCOPIC CHOLECYSTECTOMY: CPT | Mod: ,,, | Performed by: SURGERY

## 2024-04-05 PROCEDURE — 36415 COLL VENOUS BLD VENIPUNCTURE: CPT

## 2024-04-05 PROCEDURE — 63600175 PHARM REV CODE 636 W HCPCS: Mod: JZ,JG | Performed by: SURGERY

## 2024-04-05 PROCEDURE — 88304 TISSUE EXAM BY PATHOLOGIST: CPT | Performed by: PATHOLOGY

## 2024-04-05 PROCEDURE — 36000710: Performed by: SURGERY

## 2024-04-05 PROCEDURE — 36415 COLL VENOUS BLD VENIPUNCTURE: CPT | Performed by: FAMILY MEDICINE

## 2024-04-05 PROCEDURE — 37000009 HC ANESTHESIA EA ADD 15 MINS: Performed by: SURGERY

## 2024-04-05 PROCEDURE — 63600175 PHARM REV CODE 636 W HCPCS: Performed by: SURGERY

## 2024-04-05 PROCEDURE — 37000008 HC ANESTHESIA 1ST 15 MINUTES: Performed by: SURGERY

## 2024-04-05 PROCEDURE — 99499 UNLISTED E&M SERVICE: CPT | Mod: ,,, | Performed by: PHYSICIAN ASSISTANT

## 2024-04-05 PROCEDURE — 88304 TISSUE EXAM BY PATHOLOGIST: CPT | Mod: 26,,, | Performed by: PATHOLOGY

## 2024-04-05 RX ORDER — DEXMEDETOMIDINE HYDROCHLORIDE 100 UG/ML
INJECTION, SOLUTION INTRAVENOUS
Status: DISCONTINUED | OUTPATIENT
Start: 2024-04-05 | End: 2024-04-05

## 2024-04-05 RX ORDER — FENTANYL CITRATE 50 UG/ML
25 INJECTION, SOLUTION INTRAMUSCULAR; INTRAVENOUS EVERY 5 MIN PRN
Status: DISCONTINUED | OUTPATIENT
Start: 2024-04-05 | End: 2024-04-05 | Stop reason: HOSPADM

## 2024-04-05 RX ORDER — ONDANSETRON HYDROCHLORIDE 2 MG/ML
INJECTION, SOLUTION INTRAVENOUS
Status: DISCONTINUED | OUTPATIENT
Start: 2024-04-05 | End: 2024-04-05

## 2024-04-05 RX ORDER — BUPIVACAINE HYDROCHLORIDE 2.5 MG/ML
INJECTION, SOLUTION EPIDURAL; INFILTRATION; INTRACAUDAL
Status: DISCONTINUED | OUTPATIENT
Start: 2024-04-05 | End: 2024-04-05 | Stop reason: HOSPADM

## 2024-04-05 RX ORDER — FENTANYL CITRATE 50 UG/ML
INJECTION, SOLUTION INTRAMUSCULAR; INTRAVENOUS
Status: DISCONTINUED | OUTPATIENT
Start: 2024-04-05 | End: 2024-04-05

## 2024-04-05 RX ORDER — HYDROMORPHONE HYDROCHLORIDE 2 MG/ML
0.2 INJECTION, SOLUTION INTRAMUSCULAR; INTRAVENOUS; SUBCUTANEOUS EVERY 5 MIN PRN
Status: DISCONTINUED | OUTPATIENT
Start: 2024-04-05 | End: 2024-04-05 | Stop reason: HOSPADM

## 2024-04-05 RX ORDER — LIDOCAINE HYDROCHLORIDE 20 MG/ML
INJECTION, SOLUTION EPIDURAL; INFILTRATION; INTRACAUDAL; PERINEURAL
Status: DISCONTINUED | OUTPATIENT
Start: 2024-04-05 | End: 2024-04-05

## 2024-04-05 RX ORDER — LIDOCAINE HYDROCHLORIDE 10 MG/ML
INJECTION, SOLUTION EPIDURAL; INFILTRATION; INTRACAUDAL; PERINEURAL
Status: DISCONTINUED | OUTPATIENT
Start: 2024-04-05 | End: 2024-04-05 | Stop reason: HOSPADM

## 2024-04-05 RX ORDER — ONDANSETRON HYDROCHLORIDE 2 MG/ML
4 INJECTION, SOLUTION INTRAVENOUS DAILY PRN
Status: DISCONTINUED | OUTPATIENT
Start: 2024-04-05 | End: 2024-04-05 | Stop reason: HOSPADM

## 2024-04-05 RX ORDER — MIDAZOLAM HYDROCHLORIDE 1 MG/ML
INJECTION INTRAMUSCULAR; INTRAVENOUS
Status: DISCONTINUED | OUTPATIENT
Start: 2024-04-05 | End: 2024-04-05

## 2024-04-05 RX ORDER — PROPOFOL 10 MG/ML
VIAL (ML) INTRAVENOUS
Status: DISCONTINUED | OUTPATIENT
Start: 2024-04-05 | End: 2024-04-05

## 2024-04-05 RX ORDER — ROCURONIUM BROMIDE 10 MG/ML
INJECTION, SOLUTION INTRAVENOUS
Status: DISCONTINUED | OUTPATIENT
Start: 2024-04-05 | End: 2024-04-05

## 2024-04-05 RX ORDER — OXYCODONE AND ACETAMINOPHEN 5; 325 MG/1; MG/1
1 TABLET ORAL
Status: DISCONTINUED | OUTPATIENT
Start: 2024-04-05 | End: 2024-04-05 | Stop reason: HOSPADM

## 2024-04-05 RX ORDER — SODIUM CHLORIDE 0.9 % (FLUSH) 0.9 %
2 SYRINGE (ML) INJECTION EVERY 6 HOURS
Status: DISCONTINUED | OUTPATIENT
Start: 2024-04-05 | End: 2024-04-05 | Stop reason: HOSPADM

## 2024-04-05 RX ORDER — DEXAMETHASONE SODIUM PHOSPHATE 4 MG/ML
INJECTION, SOLUTION INTRA-ARTICULAR; INTRALESIONAL; INTRAMUSCULAR; INTRAVENOUS; SOFT TISSUE
Status: DISCONTINUED | OUTPATIENT
Start: 2024-04-05 | End: 2024-04-05

## 2024-04-05 RX ORDER — MEPERIDINE HYDROCHLORIDE 25 MG/ML
12.5 INJECTION INTRAMUSCULAR; INTRAVENOUS; SUBCUTANEOUS ONCE AS NEEDED
Status: DISCONTINUED | OUTPATIENT
Start: 2024-04-05 | End: 2024-04-05 | Stop reason: HOSPADM

## 2024-04-05 RX ADMIN — HYDROMORPHONE HYDROCHLORIDE 0.2 MG: 2 INJECTION INTRAMUSCULAR; INTRAVENOUS; SUBCUTANEOUS at 05:04

## 2024-04-05 RX ADMIN — ONDANSETRON 4 MG: 2 INJECTION INTRAMUSCULAR; INTRAVENOUS at 04:04

## 2024-04-05 RX ADMIN — DEXAMETHASONE SODIUM PHOSPHATE 4 MG: 4 INJECTION, SOLUTION INTRA-ARTICULAR; INTRALESIONAL; INTRAMUSCULAR; INTRAVENOUS; SOFT TISSUE at 03:04

## 2024-04-05 RX ADMIN — PIPERACILLIN SODIUM AND TAZOBACTAM SODIUM 4.5 G: 4; .5 INJECTION, POWDER, FOR SOLUTION INTRAVENOUS at 08:04

## 2024-04-05 RX ADMIN — ROCURONIUM BROMIDE 30 MG: 10 SOLUTION INTRAVENOUS at 03:04

## 2024-04-05 RX ADMIN — DEXMEDETOMIDINE 10 MCG: 200 INJECTION, SOLUTION INTRAVENOUS at 04:04

## 2024-04-05 RX ADMIN — ROCURONIUM BROMIDE 20 MG: 10 SOLUTION INTRAVENOUS at 04:04

## 2024-04-05 RX ADMIN — PROPOFOL 200 MG: 10 INJECTION, EMULSION INTRAVENOUS at 03:04

## 2024-04-05 RX ADMIN — OXYCODONE 5 MG: 5 TABLET ORAL at 06:04

## 2024-04-05 RX ADMIN — MIDAZOLAM HYDROCHLORIDE 2 MG: 1 INJECTION, SOLUTION INTRAMUSCULAR; INTRAVENOUS at 03:04

## 2024-04-05 RX ADMIN — DEXMEDETOMIDINE 4 MCG: 200 INJECTION, SOLUTION INTRAVENOUS at 04:04

## 2024-04-05 RX ADMIN — PIPERACILLIN SODIUM AND TAZOBACTAM SODIUM 4.5 G: 4; .5 INJECTION, POWDER, FOR SOLUTION INTRAVENOUS at 11:04

## 2024-04-05 RX ADMIN — SUGAMMADEX 200 MG: 100 INJECTION, SOLUTION INTRAVENOUS at 04:04

## 2024-04-05 RX ADMIN — DEXMEDETOMIDINE 6 MCG: 200 INJECTION, SOLUTION INTRAVENOUS at 03:04

## 2024-04-05 RX ADMIN — PIPERACILLIN SODIUM AND TAZOBACTAM SODIUM 4.5 G: 4; .5 INJECTION, POWDER, FOR SOLUTION INTRAVENOUS at 03:04

## 2024-04-05 RX ADMIN — FENTANYL CITRATE 100 MCG: 50 INJECTION, SOLUTION INTRAMUSCULAR; INTRAVENOUS at 03:04

## 2024-04-05 RX ADMIN — SODIUM CHLORIDE, SODIUM LACTATE, POTASSIUM CHLORIDE, AND CALCIUM CHLORIDE: .6; .31; .03; .02 INJECTION, SOLUTION INTRAVENOUS at 03:04

## 2024-04-05 RX ADMIN — LIDOCAINE HYDROCHLORIDE 100 MG: 20 INJECTION, SOLUTION EPIDURAL; INFILTRATION; INTRACAUDAL; PERINEURAL at 03:04

## 2024-04-05 NOTE — ASSESSMENT & PLAN NOTE
Status post ERCP and sludge removed from common bile duct.  LFTs and T bili trending down today.      - to OR today for robotic cholecystectomy.  Discussed risks and benefits including but not limited to pain, bleeding, infection, scarring, damage to surrounding structures including the common bile duct, and bile leak.  Operative surgeon to obtain informed consent.    - NPO in preparation for procedure.    - continue IV fluids.    - remainder of care per primary team

## 2024-04-05 NOTE — HPI
48-year-old male without significant past medical history who presented to the ED sided abdominal/flank pain.  He reports associated dark urination for 4 days prior to this has well.  Denies any associated changes in bowel, fevers, chills, nausea, and vomiting.  Workup in the ED significant elevated bilirubin and liver function tests.  CT with cholelithiasis and choledocholithiasis.  Underwent ERCP yesterday and then sludge removed from common bile duct without stent left in place.  LFTs trending down this morning and pain has continued to improve throughout the admission.  Denies any past abdominal surgical history.  Denies taking blood thinners at home.

## 2024-04-05 NOTE — PLAN OF CARE
Patient is stable. No signs or symptoms of acute distress. Pain management with pain meds. IV antibiotics given. Incision, CDI. Chart orders reviewed.

## 2024-04-05 NOTE — CONSULTS
[facility] Surg  General Surgery  Consult Note    Patient Name: Efra Felipe  MRN: [id number]  Code Status: Full Code  Admission Date: 4/3/2024  Hospital Length of Stay: 2 days  Attending Physician: Mohit Ocasio MD  Primary Care Provider: Noelle Primary Doctor    Patient information was obtained from patient, past medical records, and ER records.     Inpatient consult to General Surgery  Consult performed by: Nakia Patterson PA-C  Consult ordered by: Mohit Ocasio MD  Reason for consult: Cholelithiasis with choledocholithiasis  Assessment/Recommendations: To OR today for robotic cholecystectomy        Subjective:     Principal Problem: Cholelithiasis with choledocholithiasis    History of Present Illness: 48-year-old male without significant past medical history who presented to the ED sided abdominal/flank pain.  He reports associated dark urination for 4 days prior to this has well.  Denies any associated changes in bowel, fevers, chills, nausea, and vomiting.  Workup in the ED significant elevated bilirubin and liver function tests.  CT with cholelithiasis and choledocholithiasis.  Underwent ERCP yesterday and then sludge removed from common bile duct without stent left in place.  LFTs trending down this morning and pain has continued to improve throughout the admission.  Denies any past abdominal surgical history.  Denies taking blood thinners at home.    No current facility-administered medications on file prior to encounter.     Current Outpatient Medications on File Prior to Encounter   Medication Sig    methylPREDNISolone (MEDROL DOSEPACK) 4 mg tablet Take as directed on pkg    naproxen (NAPROSYN) 500 MG tablet Take 1 tablet (500 mg total) by mouth 2 (two) times daily with meals.    pantoprazole (PROTONIX) 20 MG tablet Take 2 tablets (40 mg total) by mouth once daily.       Review of patient's allergies indicates:   Allergen Reactions    Iodine     Iodine and iodide containing products Itching    Shellfish  containing products Itching       History reviewed. No pertinent past medical history.  Past Surgical History:   Procedure Laterality Date    ERCP Left 4/4/2024    Procedure: ERCP (ENDOSCOPIC RETROGRADE CHOLANGIOPANCREATOGRAPHY);  Surgeon: Ayad Segovia MD;  Location: [address]);  Service: Endoscopy;  Laterality: Left;    right hand surgery       Family History       Problem Relation (Age of Onset)    No Known Problems Mother, Father          Tobacco Use    Smoking status: Every Day     Current packs/day: 1.00     Types: Cigarettes    Smokeless tobacco: Not on file   Substance and Sexual Activity    Alcohol use: No    Drug use: No    Sexual activity: Not on file     Review of Systems   Constitutional:  Negative for chills, fever and unexpected weight change.   HENT:  Negative for congestion.    Eyes:  Negative for visual disturbance.   Respiratory:  Negative for shortness of breath.    Cardiovascular:  Negative for chest pain.   Gastrointestinal:  Positive for abdominal pain. Negative for abdominal distention, constipation, nausea, rectal pain and vomiting.   Genitourinary:  Positive for flank pain. Negative for dysuria.        Dark urine   Musculoskeletal:  Negative for arthralgias.   Skin:  Negative for rash.   Neurological:  Negative for light-headedness.   Hematological:  Negative for adenopathy.     Objective:     Vital Signs (Most Recent):  Temp: 97.5 °F (36.4 °C) (04/05/24 0737)  Pulse: (!) 56 (04/05/24 0737)  Resp: 18 (04/05/24 0822)  BP: (!) 102/51 (04/05/24 0737)  SpO2: 98 % (04/05/24 0822) Vital Signs (24h Range):  Temp:  [97.5 °F (36.4 °C)-98.6 °F (37 °C)] 97.5 °F (36.4 °C)  Pulse:  [48-63] 56  Resp:  [16-25] 18  SpO2:  [92 %-99 %] 98 %  BP: ()/(51-85) 102/51     Weight: 104.6 kg (230 lb 9.6 oz)  Body mass index is 31.28 kg/m².     Physical Exam  Vitals reviewed.   Constitutional:       General: He is not in acute distress.     Appearance: He is well-developed. He is not toxic-appearing.    HENT:      Head: Normocephalic and atraumatic.      Right Ear: External ear normal.      Left Ear: External ear normal.      Nose: Nose normal.      Mouth/Throat:      Mouth: Mucous membranes are moist.   Eyes:      Extraocular Movements: Extraocular movements intact.      Conjunctiva/sclera: Conjunctivae normal.   Cardiovascular:      Rate and Rhythm: Normal rate.   Pulmonary:      Effort: Pulmonary effort is normal. No respiratory distress.   Abdominal:      Comments: Abdomen is soft, nontender, nondistended on exam today   Musculoskeletal:      Cervical back: Normal range of motion and neck supple.   Skin:     General: Skin is warm and dry.   Neurological:      Mental Status: He is alert and oriented to person, place, and time.   Psychiatric:         Behavior: Behavior normal.            I have reviewed all pertinent lab results within the past 24 hours.  CBC:   Recent Labs   Lab 04/03/24  1304   WBC 5.87   RBC 4.82   HGB 14.8   HCT 43.5      MCV 90   MCH 30.7   MCHC 34.0     CMP:   Recent Labs   Lab 04/03/24  1304 04/04/24  0701 04/05/24  0750     --   --    CALCIUM 9.7  --   --    ALBUMIN 3.8   < > 3.6   PROT 8.0   < > 7.1     --   --    K 4.1  --   --    CO2 21*  --   --      --   --    BUN 12  --   --    CREATININE 1.1  --   --    ALKPHOS 459*   < > 410*   *   < > 431*   *   < > 107*   BILITOT 4.5*   < > 2.1*    < > = values in this interval not displayed.       Significant Diagnostics:  I have reviewed all pertinent imaging results/findings within the past 24 hours.  CT and ERCP reviewed as per HPI    Assessment/Plan:     * Cholelithiasis with choledocholithiasis  Status post ERCP and sludge removed from common bile duct.  LFTs and T bili trending down today.      - to OR today for robotic cholecystectomy.  Discussed risks and benefits including but not limited to pain, bleeding, infection, scarring, damage to surrounding structures including the common bile duct,  and bile leak.  Operative surgeon to obtain informed consent.    - NPO in preparation for procedure.    - continue IV fluids.    - remainder of care per primary team          VTE Risk Mitigation (From admission, onward)           Ordered     Reason for No Pharmacological VTE Prophylaxis  Once        Comments: Planned surgical procedure   Question:  Reasons:  Answer:  Physician Provided (leave comment)    04/03/24 1943     IP VTE LOW RISK PATIENT  Once         04/03/24 1943     Place sequential compression device  Until discontinued         04/03/24 1943                    Thank you for your consult. I will follow-up with patient. Please contact us if you have any additional questions.    Nakia Patterson PA-C  General Surgery  [facility] - [facility] Surg

## 2024-04-05 NOTE — OP NOTE
[facility] - Surgery ([facility])  Surgery Department  Operative Note    SUMMARY     Date of Procedure: 4/5/2024     Procedure: Procedure(s) (LRB):  XI ROBOTIC CHOLECYSTECTOMY (N/A)     Surgeon(s) and Role:     * Jacque Love MD - Primary    Assisting Surgeon: None    Pre-Operative Diagnosis: Choledocholithiasis [K80.50]    Post-Operative Diagnosis: Post-Op Diagnosis Codes:     * Choledocholithiasis [K80.50]    Anesthesia: General    Operative Findings (including complications, if any): Robotic cholecystectomy    Description of Technical Procedures:  Cholelithiasis    Estimated Blood Loss (EBL): 20cc           Implants: * No implants in log *    Specimens:   Specimen (24h ago, onward)       Start     Ordered    04/05/24 1636  Specimen to Pathology, Surgery General Surgery  Once        Comments: Pre-op Diagnosis: Choledocholithiasis [K80.50]Procedure(s):XI ROBOTIC CHOLECYSTECTOMY Number of specimens: 1Name of specimens: 1. Gallbladder with stones-perm     References:    Click here for ordering Quick Tip   Question Answer Comment   Procedure Type: General Surgery    Specimen Class: Routine/Screening    Which provider would you like to cc? JACQUE LOVE    Release to patient Immediate        04/05/24 1637                            Condition: Good    Disposition: PACU - hemodynamically stable.    Procedure in detail:   The patient was brought to the OR and underwent general anesthesia. The abdomen was prepped and draped in usual sterile fashion.  An incision was made just above the umbilicus.  Fascia grasped and elevated.  A Veress needle was inserted and insufflation obtained to 15 mm Hg.  An 8 mm robotic Optiview port was placed at this site. The laparoscope was inserted. There was no evidence of a vascular or enteric injury.     Additional trocars were placed as follows under visualization. An 8 mm trocar was placed in the anterior axillary line of the right mid abdomen. An 8 mm trocar was placed in the midclavicular  line of the right midabdomen. An 8 mm robotic trocar was placed in the midclavicular line in the left midabdomen      The patient was placed in reverse Trendelenburg position and rolled to the left. The patient was docked to the da Mendez robot.         The fundus of the gallbladder was retracted cephalad. The gallbladder infundibulum was retracted laterally.      Through meticulous dissection the cystic duct was dissected circumferentially. The cystic artery was dissected circumferentially and the neck of the gallbladder was then dissected off the gallbladder bed. This cleared Calots triangle of all loose areolar tissue and the critical view was obtained.     Indocyanine green with firefly technology was used to elucidate the course of the cystic duct and biliary anatomy.      The cystic artery was clipped with 2 clips proximally 1 clip distally and transected.  The cystic duct was clipped twice proximally and once distally and divided.     The gallbladder was dissected free from the gallbladder bed.    The gallbladder bed was inspected and hemostasis was ensured using hook electrocautery. The area was irrigated until clear.     The umbilical operating arm of the robot was then removed and an Endo Catch bag was inserted. The gallbladder was placed in Endo Catch bag. The patient was then undocked from the da Mendez operating robot. The gallbladder was extracted.     The umbilical fascia was closed with 0 Vicryl sutures in interrupted fashion.     The trocars were removed under direct vision and no bleeding was noted. The abdomen was then desufflated. Marcaine was infiltrated. All incisions were closed with 4-0 Monocryl in a subcuticular manner. Dermaflex was applied to the incision sites

## 2024-04-05 NOTE — SUBJECTIVE & OBJECTIVE
Interval History:   No acute issues reported overnight.    Review of Systems   Constitutional:  Negative for fatigue and fever.   HENT:  Negative for sinus pressure.    Eyes:  Negative for visual disturbance.   Respiratory:  Negative for shortness of breath.    Cardiovascular:  Negative for chest pain.   Gastrointestinal:  Positive for abdominal pain. Negative for nausea and vomiting.   Genitourinary:  Negative for difficulty urinating.   Musculoskeletal:  Negative for back pain.   Skin:  Negative for rash.   Neurological:  Negative for headaches.   Psychiatric/Behavioral:  Negative for confusion.      Objective:     Vital Signs (Most Recent):  Temp: 97.1 °F (36.2 °C) (04/05/24 1705)  Pulse: 62 (04/05/24 1730)  Resp: 20 (04/05/24 1730)  BP: 113/67 (04/05/24 1725)  SpO2: 97 % (04/05/24 1730) Vital Signs (24h Range):  Temp:  [97.1 °F (36.2 °C)-98.4 °F (36.9 °C)] 97.1 °F (36.2 °C)  Pulse:  [52-88] 62  Resp:  [14-22] 20  SpO2:  [92 %-98 %] 97 %  BP: ()/(51-78) 113/67     Weight: 104.6 kg (230 lb 9.6 oz)  Body mass index is 31.28 kg/m².    Intake/Output Summary (Last 24 hours) at 4/5/2024 1739  Last data filed at 4/5/2024 1700  Gross per 24 hour   Intake 1896.75 ml   Output 1 ml   Net 1895.75 ml         Physical Exam  Constitutional:       General: He is not in acute distress.     Appearance: He is well-developed. He is not diaphoretic.   HENT:      Head: Normocephalic and atraumatic.   Eyes:      Pupils: Pupils are equal, round, and reactive to light.   Cardiovascular:      Rate and Rhythm: Normal rate and regular rhythm.      Heart sounds: Normal heart sounds. No murmur heard.     No friction rub. No gallop.   Pulmonary:      Effort: Pulmonary effort is normal. No respiratory distress.      Breath sounds: Normal breath sounds. No stridor. No wheezing or rales.   Abdominal:      General: Bowel sounds are normal. There is no distension.      Palpations: Abdomen is soft. There is no mass.      Tenderness: There is  abdominal tenderness. There is no guarding.   Skin:     General: Skin is warm.      Findings: No erythema.   Neurological:      Mental Status: He is alert and oriented to person, place, and time.             Significant Labs: All pertinent labs within the past 24 hours have been reviewed.    Significant Imaging: I have reviewed all pertinent imaging results/findings within the past 24 hours.

## 2024-04-05 NOTE — TRANSFER OF CARE
Anesthesia Transfer of Care Note    Patient: Efra Felipe    Procedure(s) Performed: Procedure(s) (LRB):  XI ROBOTIC CHOLECYSTECTOMY (N/A)    Patient location: PACU    Anesthesia Type: general    Transport from OR: Transported from OR on room air with adequate spontaneous ventilation    Post pain: adequate analgesia    Post assessment: no apparent anesthetic complications    Post vital signs: stable    Level of consciousness: sedated    Nausea/Vomiting: no nausea/vomiting    Complications: none    Transfer of care protocol was followed      Last vitals: Visit Vitals  /78 (BP Location: Left arm, Patient Position: Sitting)   Pulse 85   Temp 36.9 °C (98.4 °F) (Oral)   Resp 18   Ht 6' (1.829 m)   Wt 104.6 kg (230 lb 9.6 oz)   SpO2 95%   BMI 31.28 kg/m²

## 2024-04-05 NOTE — SUBJECTIVE & OBJECTIVE
No current facility-administered medications on file prior to encounter.     Current Outpatient Medications on File Prior to Encounter   Medication Sig    methylPREDNISolone (MEDROL DOSEPACK) 4 mg tablet Take as directed on pkg    naproxen (NAPROSYN) 500 MG tablet Take 1 tablet (500 mg total) by mouth 2 (two) times daily with meals.    pantoprazole (PROTONIX) 20 MG tablet Take 2 tablets (40 mg total) by mouth once daily.       Review of patient's allergies indicates:   Allergen Reactions    Iodine     Iodine and iodide containing products Itching    Shellfish containing products Itching       History reviewed. No pertinent past medical history.  Past Surgical History:   Procedure Laterality Date    ERCP Left 4/4/2024    Procedure: ERCP (ENDOSCOPIC RETROGRADE CHOLANGIOPANCREATOGRAPHY);  Surgeon: Ayad Segovia MD;  Location: [address];  Service: Endoscopy;  Laterality: Left;    right hand surgery       Family History       Problem Relation (Age of Onset)    No Known Problems Mother, Father          Tobacco Use    Smoking status: Every Day     Current packs/day: 1.00     Types: Cigarettes    Smokeless tobacco: Not on file   Substance and Sexual Activity    Alcohol use: No    Drug use: No    Sexual activity: Not on file     Review of Systems   Constitutional:  Negative for chills, fever and unexpected weight change.   HENT:  Negative for congestion.    Eyes:  Negative for visual disturbance.   Respiratory:  Negative for shortness of breath.    Cardiovascular:  Negative for chest pain.   Gastrointestinal:  Positive for abdominal pain. Negative for abdominal distention, constipation, nausea, rectal pain and vomiting.   Genitourinary:  Positive for flank pain. Negative for dysuria.        Dark urine   Musculoskeletal:  Negative for arthralgias.   Skin:  Negative for rash.   Neurological:  Negative for light-headedness.   Hematological:  Negative for adenopathy.     Objective:     Vital Signs (Most  Recent):  Temp: 97.5 °F (36.4 °C) (04/05/24 0737)  Pulse: (!) 56 (04/05/24 0737)  Resp: 18 (04/05/24 0822)  BP: (!) 102/51 (04/05/24 0737)  SpO2: 98 % (04/05/24 0822) Vital Signs (24h Range):  Temp:  [97.5 °F (36.4 °C)-98.6 °F (37 °C)] 97.5 °F (36.4 °C)  Pulse:  [48-63] 56  Resp:  [16-25] 18  SpO2:  [92 %-99 %] 98 %  BP: ()/(51-85) 102/51     Weight: 104.6 kg (230 lb 9.6 oz)  Body mass index is 31.28 kg/m².     Physical Exam  Vitals reviewed.   Constitutional:       General: He is not in acute distress.     Appearance: He is well-developed. He is not toxic-appearing.   HENT:      Head: Normocephalic and atraumatic.      Right Ear: External ear normal.      Left Ear: External ear normal.      Nose: Nose normal.      Mouth/Throat:      Mouth: Mucous membranes are moist.   Eyes:      Extraocular Movements: Extraocular movements intact.      Conjunctiva/sclera: Conjunctivae normal.   Cardiovascular:      Rate and Rhythm: Normal rate.   Pulmonary:      Effort: Pulmonary effort is normal. No respiratory distress.   Abdominal:      Comments: Abdomen is soft, nontender, nondistended on exam today   Musculoskeletal:      Cervical back: Normal range of motion and neck supple.   Skin:     General: Skin is warm and dry.   Neurological:      Mental Status: He is alert and oriented to person, place, and time.   Psychiatric:         Behavior: Behavior normal.            I have reviewed all pertinent lab results within the past 24 hours.  CBC:   Recent Labs   Lab 04/03/24  1304   WBC 5.87   RBC 4.82   HGB 14.8   HCT 43.5      MCV 90   MCH 30.7   MCHC 34.0     CMP:   Recent Labs   Lab 04/03/24  1304 04/04/24  0701 04/05/24  0750     --   --    CALCIUM 9.7  --   --    ALBUMIN 3.8   < > 3.6   PROT 8.0   < > 7.1     --   --    K 4.1  --   --    CO2 21*  --   --      --   --    BUN 12  --   --    CREATININE 1.1  --   --    ALKPHOS 459*   < > 410*   *   < > 431*   *   < > 107*   BILITOT 4.5*    < > 2.1*    < > = values in this interval not displayed.       Significant Diagnostics:  I have reviewed all pertinent imaging results/findings within the past 24 hours.  CT and ERCP reviewed as per HPI

## 2024-04-05 NOTE — PROGRESS NOTES
[facility] Medicine  Progress Note    Patient Name: Efra Felipe  MRN: [id number]  Patient Class: IP- Inpatient   Admission Date: 4/3/2024  Length of Stay: 2 days  Attending Physician: Mohit Ocasio MD  Primary Care Provider: No, Primary Doctor        Subjective:     Principal Problem:Cholelithiasis with choledocholithiasis        HPI:  Efra Felipe is a 48 y.o. male with a PMH  has no past medical history on file.  Presented back to the ER for evaluation and treatment of right upper quadrant/right flank pain.  Patient was seen in the ER last night and was offered admission, but left AMA.  Workup revealed choledocholithiasis.  GI was consulted and recommended ERCP.  Reports pain in his right upper quadrant/flank area as a dull/pressure-like sensation that radiates into his right shoulder.  Denies any specific alleviating or aggravating factors.  Denies any other associated symptoms such as fever, aches, chills, sweats, nausea, vomiting, diarrhea, chest pain, palpitation, shortness for breath, dyspnea exertion, lower abdominal pain common bladder/bowel complaints, or any other symptoms at this time.    ER workup revealed ALP of 459, total bilirubin 4.5, AST/ALT of 279/6085, and lipase level of 38.  CTA abdomen and pelvis revealed:[Cholelithiasis.  Dilated common bile duct with multiple apparent calculi in the distal extrahepatic common bile duct suggesting choledocholithiasis].  GI notified patient has returned to ER.  Recommend Hospital Medicine to admit, make NPO, initiate antibiotics, and plan to have ERCP performed as it come and go at [facility] tomorrow.  Patient in agreement with treatment plan.  Patient will be admitted under inpatient status.    PCP: Noelle Primary Doctor    Overview/Hospital Course:   4/5:  Patient is status post ERCP 4/4,  cholecystectomy planned for today.  Continue Zosyn.    Interval History:   No acute issues reported overnight.    Review of Systems    Constitutional:  Negative for fatigue and fever.   HENT:  Negative for sinus pressure.    Eyes:  Negative for visual disturbance.   Respiratory:  Negative for shortness of breath.    Cardiovascular:  Negative for chest pain.   Gastrointestinal:  Positive for abdominal pain. Negative for nausea and vomiting.   Genitourinary:  Negative for difficulty urinating.   Musculoskeletal:  Negative for back pain.   Skin:  Negative for rash.   Neurological:  Negative for headaches.   Psychiatric/Behavioral:  Negative for confusion.      Objective:     Vital Signs (Most Recent):  Temp: 97.1 °F (36.2 °C) (04/05/24 1705)  Pulse: 62 (04/05/24 1730)  Resp: 20 (04/05/24 1730)  BP: 113/67 (04/05/24 1725)  SpO2: 97 % (04/05/24 1730) Vital Signs (24h Range):  Temp:  [97.1 °F (36.2 °C)-98.4 °F (36.9 °C)] 97.1 °F (36.2 °C)  Pulse:  [52-88] 62  Resp:  [14-22] 20  SpO2:  [92 %-98 %] 97 %  BP: ()/(51-78) 113/67     Weight: 104.6 kg (230 lb 9.6 oz)  Body mass index is 31.28 kg/m².    Intake/Output Summary (Last 24 hours) at 4/5/2024 1739  Last data filed at 4/5/2024 1700  Gross per 24 hour   Intake 1896.75 ml   Output 1 ml   Net 1895.75 ml         Physical Exam  Constitutional:       General: He is not in acute distress.     Appearance: He is well-developed. He is not diaphoretic.   HENT:      Head: Normocephalic and atraumatic.   Eyes:      Pupils: Pupils are equal, round, and reactive to light.   Cardiovascular:      Rate and Rhythm: Normal rate and regular rhythm.      Heart sounds: Normal heart sounds. No murmur heard.     No friction rub. No gallop.   Pulmonary:      Effort: Pulmonary effort is normal. No respiratory distress.      Breath sounds: Normal breath sounds. No stridor. No wheezing or rales.   Abdominal:      General: Bowel sounds are normal. There is no distension.      Palpations: Abdomen is soft. There is no mass.      Tenderness: There is abdominal tenderness. There is no guarding.   Skin:     General: Skin is warm.       Findings: No erythema.   Neurological:      Mental Status: He is alert and oriented to person, place, and time.             Significant Labs: All pertinent labs within the past 24 hours have been reviewed.    Significant Imaging: I have reviewed all pertinent imaging results/findings within the past 24 hours.    Assessment/Plan:      * Cholelithiasis with choledocholithiasis  Plan:  -NPO  -IVFs  -Analgesics prn  -Antiemetics prn  -IV Abx  -Avoid hepatotoxic agents  -GI consulted  -Plan for ERCP tomorrow  -repeat Hepatic panel in am    4/5:   Status post ERCP 4/4   Surgery consult on case   Cholecystectomy planned today   Continue Zosyn        VTE Risk Mitigation (From admission, onward)           Ordered     Reason for No Pharmacological VTE Prophylaxis  Once        Comments: Planned surgical procedure   Question:  Reasons:  Answer:  Physician Provided (leave comment)    04/03/24 1943     IP VTE LOW RISK PATIENT  Once         04/03/24 1943     Place sequential compression device  Until discontinued         04/03/24 1943                    Discharge Planning   ELYSIA:      Code Status: Full Code   Is the patient medically ready for discharge?:     Reason for patient still in hospital (select all that apply): Patient trending condition  Discharge Plan A: Home                  Mohit Ocasio MD  Department of Hospital Medicine   '[facility] - Surgery ([facility])

## 2024-04-05 NOTE — ASSESSMENT & PLAN NOTE
Plan:  -NPO  -IVFs  -Analgesics prn  -Antiemetics prn  -IV Abx  -Avoid hepatotoxic agents  -GI consulted  -Plan for ERCP tomorrow  -repeat Hepatic panel in am    4/5:   Status post ERCP 4/4   Surgery consult on case   Cholecystectomy planned today   Continue Zosyn

## 2024-04-05 NOTE — PROGRESS NOTES
S/p ERCP. No acute events overnight. LFTs trending down specifically T. Bili that went from 5.0 to 2.1. No further GI recs or interventions needed at this time. Contact us for questions or issues.   Geronimo Colunga PA-C.

## 2024-04-05 NOTE — ANESTHESIA PREPROCEDURE EVALUATION
04/05/2024  Efra Felipe is a 48 y.o., male.      Pre-op Assessment    I have reviewed the Patient Summary Reports.     I have reviewed the Nursing Notes. I have reviewed the NPO Status.      Review of Systems  Anesthesia Hx:  No problems with previous Anesthesia                Hematology/Oncology:  Hematology Normal   Oncology Normal                                   Renal/:  Renal/ Normal                 Hepatic/GI:  Hepatic/GI Normal                 Neurological:  Neurology Normal                                      Endocrine:  Endocrine Normal            Dermatological:  Skin Normal    Psych:  Psychiatric Normal                  Patient Active Problem List   Diagnosis    Cholelithiasis with choledocholithiasis         Physical Exam  General: Well nourished, Cooperative, Alert and Oriented    Airway:  Mallampati: II / II  Mouth Opening: Normal  TM Distance: Normal  Tongue: Normal  Neck ROM: Normal ROM    Dental:  Partial Dentures  Severe periodontal disease, multiple cracked, broken and missing teeth       Anesthesia Plan  Type of Anesthesia, risks & benefits discussed:    Anesthesia Type: Gen ETT  Intra-op Monitoring Plan: Standard ASA Monitors  Post Op Pain Control Plan: multimodal analgesia  Induction:  IV  Airway Plan: Direct  Informed Consent: Informed consent signed with the Patient and all parties understand the risks and agree with anesthesia plan.  All questions answered.   ASA Score: 2  Day of Surgery Review of History & Physical: H&P Update referred to the surgeon/provider.I have interviewed and examined the patient. I have reviewed the patient's H&P dated: There are no significant changes. H&P completed by Anesthesiologist.    Ready For Surgery From Anesthesia Perspective.     .

## 2024-04-05 NOTE — HOSPITAL COURSE
Patient was admitted for cholelithiasis and choledocholithiasis.  He was transferred to [facility] for ERCP.  Patient was then transferred back to [facility] and surgery performed cholecystectomy.  Empiric Zosyn was initiated on admission.  Patient tolerated procedures well.  Pain improved.  Tolerated diet.  He was transitioned to oral Augmentin and Norco.  Patient discharged home.

## 2024-04-05 NOTE — ANESTHESIA PROCEDURE NOTES
Intubation    Date/Time: 4/5/2024 3:50 PM    Performed by: Abril Monge CRNA  Authorized by: Garrison Knox MD    Intubation:     Induction:  Intravenous    Intubated:  Postinduction    Mask Ventilation:  Easy mask    Attempts:  1    Attempted By:  CRNA    Method of Intubation:  Blind intubation    Blade:  Zamudio 3    Laryngeal View Grade: Grade I - full view of cords      Difficult Airway Encountered?: No      Complications:  None    Airway Device:  Oral endotracheal tube    Airway Device Size:  7.5    Style/Cuff Inflation:  Cuffed (inflated to minimal occlusive pressure)    Tube secured:  22    Secured at:  The lips    Placement Verified By:  Capnometry    Complicating Factors:  None    Findings Post-Intubation:  BS equal bilateral and atraumatic/condition of teeth unchanged

## 2024-04-06 VITALS
HEART RATE: 61 BPM | OXYGEN SATURATION: 97 % | TEMPERATURE: 98 F | WEIGHT: 230.63 LBS | HEIGHT: 72 IN | SYSTOLIC BLOOD PRESSURE: 107 MMHG | RESPIRATION RATE: 18 BRPM | DIASTOLIC BLOOD PRESSURE: 53 MMHG | BODY MASS INDEX: 31.24 KG/M2

## 2024-04-06 LAB
ALBUMIN SERPL BCP-MCNC: 3.5 G/DL (ref 3.5–5.2)
ALP SERPL-CCNC: 341 U/L (ref 55–135)
ALT SERPL W/O P-5'-P-CCNC: 323 U/L (ref 10–44)
ANION GAP SERPL CALC-SCNC: 10 MMOL/L (ref 8–16)
AST SERPL-CCNC: 72 U/L (ref 10–40)
BASOPHILS # BLD AUTO: 0.05 K/UL (ref 0–0.2)
BASOPHILS NFR BLD: 0.4 % (ref 0–1.9)
BILIRUB SERPL-MCNC: 1.4 MG/DL (ref 0.1–1)
BUN SERPL-MCNC: 16 MG/DL (ref 6–20)
CALCIUM SERPL-MCNC: 9.4 MG/DL (ref 8.7–10.5)
CHLORIDE SERPL-SCNC: 104 MMOL/L (ref 95–110)
CO2 SERPL-SCNC: 24 MMOL/L (ref 23–29)
CREAT SERPL-MCNC: 1.2 MG/DL (ref 0.5–1.4)
DIFFERENTIAL METHOD BLD: ABNORMAL
EOSINOPHIL # BLD AUTO: 0.1 K/UL (ref 0–0.5)
EOSINOPHIL NFR BLD: 1 % (ref 0–8)
ERYTHROCYTE [DISTWIDTH] IN BLOOD BY AUTOMATED COUNT: 14.7 % (ref 11.5–14.5)
EST. GFR  (NO RACE VARIABLE): >60 ML/MIN/1.73 M^2
GLUCOSE SERPL-MCNC: 124 MG/DL (ref 70–110)
HCT VFR BLD AUTO: 40.7 % (ref 40–54)
HGB BLD-MCNC: 13.8 G/DL (ref 14–18)
IMM GRANULOCYTES # BLD AUTO: 0.03 K/UL (ref 0–0.04)
IMM GRANULOCYTES NFR BLD AUTO: 0.3 % (ref 0–0.5)
LYMPHOCYTES # BLD AUTO: 3.3 K/UL (ref 1–4.8)
LYMPHOCYTES NFR BLD: 28.1 % (ref 18–48)
MCH RBC QN AUTO: 31.2 PG (ref 27–31)
MCHC RBC AUTO-ENTMCNC: 33.9 G/DL (ref 32–36)
MCV RBC AUTO: 92 FL (ref 82–98)
MONOCYTES # BLD AUTO: 0.7 K/UL (ref 0.3–1)
MONOCYTES NFR BLD: 6.2 % (ref 4–15)
NEUTROPHILS # BLD AUTO: 7.4 K/UL (ref 1.8–7.7)
NEUTROPHILS NFR BLD: 64 % (ref 38–73)
NRBC BLD-RTO: 0 /100 WBC
PLATELET # BLD AUTO: 239 K/UL (ref 150–450)
PMV BLD AUTO: 10.8 FL (ref 9.2–12.9)
POTASSIUM SERPL-SCNC: 4 MMOL/L (ref 3.5–5.1)
PROT SERPL-MCNC: 7.3 G/DL (ref 6–8.4)
RBC # BLD AUTO: 4.42 M/UL (ref 4.6–6.2)
SODIUM SERPL-SCNC: 138 MMOL/L (ref 136–145)
WBC # BLD AUTO: 11.62 K/UL (ref 3.9–12.7)

## 2024-04-06 PROCEDURE — 80053 COMPREHEN METABOLIC PANEL: CPT | Performed by: COLON & RECTAL SURGERY

## 2024-04-06 PROCEDURE — 99024 POSTOP FOLLOW-UP VISIT: CPT | Mod: ,,, | Performed by: COLON & RECTAL SURGERY

## 2024-04-06 PROCEDURE — 25000003 PHARM REV CODE 250: Performed by: SURGERY

## 2024-04-06 PROCEDURE — 36415 COLL VENOUS BLD VENIPUNCTURE: CPT | Performed by: COLON & RECTAL SURGERY

## 2024-04-06 PROCEDURE — 85025 COMPLETE CBC W/AUTO DIFF WBC: CPT | Performed by: COLON & RECTAL SURGERY

## 2024-04-06 PROCEDURE — 63600175 PHARM REV CODE 636 W HCPCS: Performed by: SURGERY

## 2024-04-06 PROCEDURE — 94761 N-INVAS EAR/PLS OXIMETRY MLT: CPT

## 2024-04-06 RX ORDER — HYDROCODONE BITARTRATE AND ACETAMINOPHEN 7.5; 325 MG/1; MG/1
1 TABLET ORAL EVERY 6 HOURS PRN
Qty: 20 TABLET | Refills: 0 | Status: SHIPPED | OUTPATIENT
Start: 2024-04-06 | End: 2024-04-22

## 2024-04-06 RX ORDER — AMOXICILLIN AND CLAVULANATE POTASSIUM 875; 125 MG/1; MG/1
1 TABLET, FILM COATED ORAL EVERY 12 HOURS
Qty: 8 TABLET | Refills: 0 | Status: SHIPPED | OUTPATIENT
Start: 2024-04-06 | End: 2024-04-10

## 2024-04-06 RX ADMIN — OXYCODONE 5 MG: 5 TABLET ORAL at 08:04

## 2024-04-06 RX ADMIN — PIPERACILLIN SODIUM AND TAZOBACTAM SODIUM 4.5 G: 4; .5 INJECTION, POWDER, FOR SOLUTION INTRAVENOUS at 11:04

## 2024-04-06 RX ADMIN — PIPERACILLIN SODIUM AND TAZOBACTAM SODIUM 4.5 G: 4; .5 INJECTION, POWDER, FOR SOLUTION INTRAVENOUS at 03:04

## 2024-04-06 NOTE — SUBJECTIVE & OBJECTIVE
Interval History:  Doing well.  No nausea or vomiting.  Tolerating diet.  Pain well controlled.    Medications:  Continuous Infusions:   lactated ringers Stopped (04/04/24 0449)     Scheduled Meds:   piperacillin-tazobactam (Zosyn) IV (PEDS and ADULTS) (extended infusion is not appropriate)  4.5 g Intravenous Q8H     PRN Meds:aluminum-magnesium hydroxide-simethicone, dextrose 10%, dextrose 10%, glucagon (human recombinant), glucose, glucose, melatonin, morphine, naloxone, ondansetron, oxyCODONE, prochlorperazine, simethicone, sodium chloride 0.9%     Review of patient's allergies indicates:   Allergen Reactions    Iodine     Iodine and iodide containing products Itching    Shellfish containing products Itching     Objective:     Vital Signs (Most Recent):  Temp: 97.6 °F (36.4 °C) (04/06/24 0710)  Pulse: 61 (04/06/24 0710)  Resp: 18 (04/06/24 0825)  BP: (!) 107/53 (04/06/24 0710)  SpO2: 97 % (04/06/24 0738) Vital Signs (24h Range):  Temp:  [97.1 °F (36.2 °C)-99.2 °F (37.3 °C)] 97.6 °F (36.4 °C)  Pulse:  [61-88] 61  Resp:  [14-22] 18  SpO2:  [92 %-98 %] 97 %  BP: (107-132)/(53-78) 107/53     Weight: 104.6 kg (230 lb 9.6 oz)  Body mass index is 31.28 kg/m².    Intake/Output - Last 3 Shifts         04/04 0700  04/05 0659 04/05 0700  04/06 0659 04/06 0700  04/07 0659    I.V. (mL/kg) 1000.9 (9.6)      IV Piggyback 495.8 800     Total Intake(mL/kg) 1496.8 (14.4) 800 (7.7)     Urine (mL/kg/hr) 1 (0)      Total Output 1      Net +1495.8 +800                     Physical Exam  Vitals reviewed.   Constitutional:       General: He is not in acute distress.     Appearance: He is well-developed. He is not toxic-appearing.   HENT:      Head: Normocephalic and atraumatic.      Right Ear: External ear normal.      Left Ear: External ear normal.      Nose: Nose normal.      Mouth/Throat:      Mouth: Mucous membranes are moist.   Eyes:      Extraocular Movements: Extraocular movements intact.      Conjunctiva/sclera: Conjunctivae  normal.   Cardiovascular:      Rate and Rhythm: Normal rate.   Pulmonary:      Effort: Pulmonary effort is normal. No respiratory distress.   Abdominal:      Comments: Soft, nondistended, appropriately TTP; incisions c/d/I without drainage   Musculoskeletal:      Cervical back: Normal range of motion and neck supple.   Skin:     General: Skin is warm and dry.   Neurological:      Mental Status: He is alert and oriented to person, place, and time.   Psychiatric:         Behavior: Behavior normal.          Significant Labs:  I have reviewed all pertinent lab results within the past 24 hours.  CBC:   Recent Labs   Lab 04/05/24  0903   WBC 6.79   RBC 4.74   HGB 14.6   HCT 42.3      MCV 89   MCH 30.8   MCHC 34.5     BMP:   Recent Labs   Lab 04/05/24  0903         K 4.4      CO2 27   BUN 14   CREATININE 1.1   CALCIUM 9.5     CMP:   Recent Labs   Lab 04/05/24  0903      CALCIUM 9.5   ALBUMIN 3.7   PROT 7.2      K 4.4   CO2 27      BUN 14   CREATININE 1.1   ALKPHOS 421*   *   *   BILITOT 2.2*     LFTs:   Recent Labs   Lab 04/05/24  0903   *   *   ALKPHOS 421*   BILITOT 2.2*   PROT 7.2   ALBUMIN 3.7       Significant Diagnostics:  I have reviewed all pertinent imaging results/findings within the past 24 hours.

## 2024-04-06 NOTE — PLAN OF CARE
[facility] Med Surg  Discharge Final Note    Primary Care Provider: No, Primary Doctor    Expected Discharge Date: 4/6/2024    Final Discharge Note (most recent)       Final Note - 04/06/24 1109          Final Note    Assessment Type Final Discharge Note     Anticipated Discharge Disposition Home or Self Care        Post-Acute Status    Discharge Delays None known at this time                     Important Message from Medicare             Contact Info       Edmundo Buck MD   Specialty: General Surgery, Bariatrics    [address] [facility]  4th Floor  [facility] [address]   Phone: [phone]       Next Steps: Follow up in 2 week(s)    Instructions: postop check          Discharge home, no home health or dme orders noted.

## 2024-04-06 NOTE — PLAN OF CARE
Patient is in stable condition, no acute distress, remained free from injuries, receiving IV antibiotics, pain adequately controlled, kailey sites to abdomen CDI, VSS, and all active orders reviewed. 24 hr chart check performed.

## 2024-04-06 NOTE — ANESTHESIA POSTPROCEDURE EVALUATION
Anesthesia Post Evaluation    Patient: Efra Felipe    Procedure(s) Performed: Procedure(s) (LRB):  XI ROBOTIC CHOLECYSTECTOMY (N/A)    Final Anesthesia Type: general      Patient location during evaluation: PACU  Patient participation: Yes- Able to Participate  Level of consciousness: awake and alert and oriented  Post-procedure vital signs: reviewed and stable  Pain management: adequate  Airway patency: patent  LEANDRO mitigation strategies: Verification of full reversal of neuromuscular block  PONV status at discharge: No PONV  Anesthetic complications: no      Cardiovascular status: blood pressure returned to baseline and hemodynamically stable  Respiratory status: unassisted  Hydration status: euvolemic  Follow-up not needed.              Vitals Value Taken Time   /69 04/05/24 2001   Temp 36.7 °C (98 °F) 04/05/24 2001   Pulse 84 04/05/24 2001   Resp 18 04/05/24 2001   SpO2 96 % 04/05/24 2001         Event Time   Out of Recovery 04/05/2024 17:33:27         Pain/Ina Score: Pain Rating Prior to Med Admin: 10 (4/5/2024  6:29 PM)  Pain Rating Post Med Admin: 7 (4/5/2024  7:29 PM)  Ina Score: 8 (4/5/2024  5:30 PM)

## 2024-04-06 NOTE — ASSESSMENT & PLAN NOTE
Status post ERCP and sludge removed from common bile duct and now s/p robotic cholecystectomy on 4/5/24    - repeat CMP. If Tbili stable or normal, okay to discharge from surgical standpoint  - okay for regular diet  - postop restrictions:  No lifting over 10 lb for 6 weeks after surgery, showers only for 2 weeks (no baths or submerging incisions underneath water), no driving while on narcotic pain medication  - followup with Dr Buck in two weeks for postop check  - rest of care per primary team

## 2024-04-06 NOTE — PLAN OF CARE
Discharge education given.Patient verbalized an understanding.IV removed, catheter intact.Patient to be discharged with personal belongings. Patient awaiting ride at this time.

## 2024-04-06 NOTE — PROGRESS NOTES
[facility] Surg  General Surgery  Progress Note    Subjective:     History of Present Illness:  48-year-old male without significant past medical history who presented to the ED sided abdominal/flank pain.  He reports associated dark urination for 4 days prior to this has well.  Denies any associated changes in bowel, fevers, chills, nausea, and vomiting.  Workup in the ED significant elevated bilirubin and liver function tests.  CT with cholelithiasis and choledocholithiasis.  Underwent ERCP yesterday and then sludge removed from common bile duct without stent left in place.  LFTs trending down this morning and pain has continued to improve throughout the admission.  Denies any past abdominal surgical history.  Denies taking blood thinners at home.    Post-Op Info:  Procedure(s) (LRB):  XI ROBOTIC CHOLECYSTECTOMY (N/A)   1 Day Post-Op     Interval History:  Doing well.  No nausea or vomiting.  Tolerating diet.  Pain well controlled.    Medications:  Continuous Infusions:   lactated ringers Stopped (04/04/24 0449)     Scheduled Meds:   piperacillin-tazobactam (Zosyn) IV (PEDS and ADULTS) (extended infusion is not appropriate)  4.5 g Intravenous Q8H     PRN Meds:aluminum-magnesium hydroxide-simethicone, dextrose 10%, dextrose 10%, glucagon (human recombinant), glucose, glucose, melatonin, morphine, naloxone, ondansetron, oxyCODONE, prochlorperazine, simethicone, sodium chloride 0.9%     Review of patient's allergies indicates:   Allergen Reactions    Iodine     Iodine and iodide containing products Itching    Shellfish containing products Itching     Objective:     Vital Signs (Most Recent):  Temp: 97.6 °F (36.4 °C) (04/06/24 0710)  Pulse: 61 (04/06/24 0710)  Resp: 18 (04/06/24 0825)  BP: (!) 107/53 (04/06/24 0710)  SpO2: 97 % (04/06/24 0738) Vital Signs (24h Range):  Temp:  [97.1 °F (36.2 °C)-99.2 °F (37.3 °C)] 97.6 °F (36.4 °C)  Pulse:  [61-88] 61  Resp:  [14-22] 18  SpO2:  [92 %-98 %] 97 %  BP: (107-132)/(53-78)  107/53     Weight: 104.6 kg (230 lb 9.6 oz)  Body mass index is 31.28 kg/m².    Intake/Output - Last 3 Shifts         04/04 0700  04/05 0659 04/05 0700 04/06 0659 04/06 0700 04/07 0659    I.V. (mL/kg) 1000.9 (9.6)      IV Piggyback 495.8 800     Total Intake(mL/kg) 1496.8 (14.4) 800 (7.7)     Urine (mL/kg/hr) 1 (0)      Total Output 1      Net +1495.8 +800                     Physical Exam  Vitals reviewed.   Constitutional:       General: He is not in acute distress.     Appearance: He is well-developed. He is not toxic-appearing.   HENT:      Head: Normocephalic and atraumatic.      Right Ear: External ear normal.      Left Ear: External ear normal.      Nose: Nose normal.      Mouth/Throat:      Mouth: Mucous membranes are moist.   Eyes:      Extraocular Movements: Extraocular movements intact.      Conjunctiva/sclera: Conjunctivae normal.   Cardiovascular:      Rate and Rhythm: Normal rate.   Pulmonary:      Effort: Pulmonary effort is normal. No respiratory distress.   Abdominal:      Comments: Soft, nondistended, appropriately TTP; incisions c/d/I without drainage   Musculoskeletal:      Cervical back: Normal range of motion and neck supple.   Skin:     General: Skin is warm and dry.   Neurological:      Mental Status: He is alert and oriented to person, place, and time.   Psychiatric:         Behavior: Behavior normal.          Significant Labs:  I have reviewed all pertinent lab results within the past 24 hours.  CBC:   Recent Labs   Lab 04/05/24  0903   WBC 6.79   RBC 4.74   HGB 14.6   HCT 42.3      MCV 89   MCH 30.8   MCHC 34.5     BMP:   Recent Labs   Lab 04/05/24  0903         K 4.4      CO2 27   BUN 14   CREATININE 1.1   CALCIUM 9.5     CMP:   Recent Labs   Lab 04/05/24  0903      CALCIUM 9.5   ALBUMIN 3.7   PROT 7.2      K 4.4   CO2 27      BUN 14   CREATININE 1.1   ALKPHOS 421*   *   *   BILITOT 2.2*     LFTs:   Recent Labs   Lab  04/05/24  0903   *   *   ALKPHOS 421*   BILITOT 2.2*   PROT 7.2   ALBUMIN 3.7       Significant Diagnostics:  I have reviewed all pertinent imaging results/findings within the past 24 hours.  Assessment/Plan:     * Cholelithiasis with choledocholithiasis  Status post ERCP and sludge removed from common bile duct and now s/p robotic cholecystectomy on 4/5/24    - repeat CMP. If Tbili stable or normal, okay to discharge from surgical standpoint  - okay for regular diet  - postop restrictions:  No lifting over 10 lb for 6 weeks after surgery, showers only for 2 weeks (no baths or submerging incisions underneath water), no driving while on narcotic pain medication  - followup with Dr Buck in two weeks for postop check  - rest of care per primary team      Martin Clayton MD  General Surgery  [facility] Med [facility]

## 2024-04-06 NOTE — DISCHARGE SUMMARY
[facility] Medicine  Discharge Summary      Patient Name: Efra Felipe  MRN: [id number]  LEONARD: [id number]  Patient Class: IP- Inpatient  Admission Date: 4/3/2024  Hospital Length of Stay: 3 days  Discharge Date and Time:  04/06/2024 3:38 PM  Attending Physician: Mohit Ocasio MD   Discharging Provider: Mohit Ocasio MD  Primary Care Provider: Noelle Primary Doctor    Primary Care Team: Networked reference to record PCT     HPI:   Efra Felipe is a 48 y.o. male with a PMH  has no past medical history on file.  Presented back to the ER for evaluation and treatment of right upper quadrant/right flank pain.  Patient was seen in the ER last night and was offered admission, but left AMA.  Workup revealed choledocholithiasis.  GI was consulted and recommended ERCP.  Reports pain in his right upper quadrant/flank area as a dull/pressure-like sensation that radiates into his right shoulder.  Denies any specific alleviating or aggravating factors.  Denies any other associated symptoms such as fever, aches, chills, sweats, nausea, vomiting, diarrhea, chest pain, palpitation, shortness for breath, dyspnea exertion, lower abdominal pain common bladder/bowel complaints, or any other symptoms at this time.    ER workup revealed ALP of 459, total bilirubin 4.5, AST/ALT of 279/6085, and lipase level of 38.  CTA abdomen and pelvis revealed:[Cholelithiasis.  Dilated common bile duct with multiple apparent calculi in the distal extrahepatic common bile duct suggesting choledocholithiasis].  GI notified patient has returned to ER.  Recommend Hospital Medicine to admit, make NPO, initiate antibiotics, and plan to have ERCP performed as it come and go at [facility] tomorrow.  Patient in agreement with treatment plan.  Patient will be admitted under inpatient status.    PCP: Noelle Primary Doctor    Procedure(s) (LRB):  XI ROBOTIC CHOLECYSTECTOMY (N/A)      Hospital Course:    Patient was admitted for cholelithiasis and  choledocholithiasis.  He was transferred to [facility] for ERCP.  Patient was then transferred back to [facility] and surgery performed cholecystectomy.  Empiric Zosyn was initiated on admission.  Patient tolerated procedures well.  Pain improved.  Tolerated diet.  He was transitioned to oral Augmentin and Norco.  Patient discharged home.          Goals of Care Treatment Preferences:  Code Status: Full Code      Consults:   Consults (From admission, onward)          Status Ordering Provider     Inpatient consult to General Surgery  Once        Provider:  Edmundo Buck MD    Completed LE, CATHY     Inpatient consult to Gastroenterology  Once        Provider:  Sangeeta Brian MD    Completed LE, CATHY            No new Assessment & Plan notes have been filed under this hospital service since the last note was generated.  Service: Hospital Medicine    Final Active Diagnoses:    Diagnosis Date Noted POA    PRINCIPAL PROBLEM:  Cholelithiasis with choledocholithiasis [K80.70] 04/03/2024 Yes      Problems Resolved During this Admission:       Discharged Condition: good    Disposition: Home or Self Care    Follow Up:   Follow-up Information       Edmundo Buck MD Follow up in 2 week(s).    Specialties: General Surgery, Bariatrics  Why: postop check  Contact information:  [address] [facility] [facility]  4th Floor  [facility] [id number] [phone]                           Patient Instructions:   No discharge procedures on file.    Significant Diagnostic Studies: N/A    Pending Diagnostic Studies:       Procedure Component Value Units Date/Time    Specimen to Pathology, Surgery General Surgery [[id number]] Collected: 04/05/24 1651    Order Status: Sent Lab Status: In process Updated: 04/05/24 1652    Specimen: Tissue            Medications:  Reconciled Home Medications:      Medication List        START taking these medications      amoxicillin-clavulanate 875-125mg 875-125 mg per tablet  Commonly known as:  AUGMENTIN  Take 1 tablet by mouth every 12 (twelve) hours. for 4 days     HYDROcodone-acetaminophen 7.5-325 mg per tablet  Commonly known as: NORCO  Take 1 tablet by mouth every 6 (six) hours as needed for Pain.            STOP taking these medications      methylPREDNISolone 4 mg tablet  Commonly known as: MEDROL DOSEPACK     naproxen 500 MG tablet  Commonly known as: NAPROSYN     pantoprazole 20 MG tablet  Commonly known as: PROTONIX              Indwelling Lines/Drains at time of discharge:   Lines/Drains/Airways       None                   Time spent on the discharge of patient: 36 minutes         Mohit Ocasio MD  Department of Hospital Medicine  '[place] [facility] Surg

## 2024-04-10 LAB
FINAL PATHOLOGIC DIAGNOSIS: NORMAL
GROSS: NORMAL
Lab: NORMAL

## 2024-04-15 ENCOUNTER — TELEPHONE (OUTPATIENT)
Dept: SURGERY | Facility: CLINIC | Age: 49
End: 2024-04-15
Payer: MEDICAID

## 2024-04-15 NOTE — TELEPHONE ENCOUNTER
----- Message from Nakia Patterson PA-C sent at 4/15/2024  1:39 PM CDT -----  Contact: Patient, [phone]  He can see me next week some time. Thanks!  ----- Message -----  From: Natasha Urrutia  Sent: 4/15/2024   1:28 PM CDT  To: Heber Wyatt Staff    Calling to schedule his follow up appointment. Please call him. Thanks.

## 2024-04-22 ENCOUNTER — OFFICE VISIT (OUTPATIENT)
Dept: SURGERY | Facility: CLINIC | Age: 49
End: 2024-04-22
Payer: MEDICAID

## 2024-04-22 VITALS
SYSTOLIC BLOOD PRESSURE: 136 MMHG | WEIGHT: 232 LBS | HEART RATE: 88 BPM | BODY MASS INDEX: 31.46 KG/M2 | DIASTOLIC BLOOD PRESSURE: 81 MMHG | TEMPERATURE: 98 F

## 2024-04-22 DIAGNOSIS — K80.50 CHOLEDOCHOLITHIASIS: Primary | ICD-10-CM

## 2024-04-22 PROBLEM — K80.70 CHOLELITHIASIS WITH CHOLEDOCHOLITHIASIS: Status: RESOLVED | Noted: 2024-04-03 | Resolved: 2024-04-22

## 2024-04-22 PROCEDURE — 99024 POSTOP FOLLOW-UP VISIT: CPT | Mod: ,,,

## 2024-04-22 PROCEDURE — 1159F MED LIST DOCD IN RCRD: CPT | Mod: CPTII,,,

## 2024-04-22 PROCEDURE — 99999 PR PBB SHADOW E&M-EST. PATIENT-LVL III: CPT | Mod: PBBFAC,,,

## 2024-04-22 PROCEDURE — 99213 OFFICE O/P EST LOW 20 MIN: CPT | Mod: PBBFAC

## 2024-04-22 PROCEDURE — 3075F SYST BP GE 130 - 139MM HG: CPT | Mod: CPTII,,,

## 2024-04-22 PROCEDURE — 1160F RVW MEDS BY RX/DR IN RCRD: CPT | Mod: CPTII,,,

## 2024-04-22 PROCEDURE — 3079F DIAST BP 80-89 MM HG: CPT | Mod: CPTII,,,

## 2024-04-22 NOTE — PROGRESS NOTES
History & Physical    Subjective     History of Present Illness:  Patient is a 48 y.o. male who since for postoperative evaluation status post robotic cholecystectomy.  He presented to the ED for evaluation of upper abdominal pain and was found to have cholecystitis with choledocholithiasis.  He underwent ERCP without stent placement and subsequently underwent robotic cholecystectomy.  He is doing well today with minimal remaining and improving abdominal soreness.  He has tolerating a regular diet and having normal bowel movements.  He denies any fevers, chills, nausea, and vomiting.  He is urinating without issue.    Chief Complaint   Patient presents with    Post-op Evaluation       Review of patient's allergies indicates:   Allergen Reactions    Iodine     Iodine and iodide containing products Itching    Shellfish containing products Itching       Current Outpatient Medications   Medication Sig Dispense Refill    HYDROcodone-acetaminophen (NORCO) 7.5-325 mg per tablet Take 1 tablet by mouth every 6 (six) hours as needed for Pain. 20 tablet 0     No current facility-administered medications for this visit.       Past Medical History:   Diagnosis Date    Cholelithiasis with choledocholithiasis 04/03/2024     Past Surgical History:   Procedure Laterality Date    ERCP Left 4/4/2024    Procedure: ERCP (ENDOSCOPIC RETROGRADE CHOLANGIOPANCREATOGRAPHY);  Surgeon: Ayad Segovia MD;  Location: [address]);  Service: Endoscopy;  Laterality: Left;    right hand surgery      ROBOT-ASSISTED CHOLECYSTECTOMY USING DA NIURKA XI N/A 4/5/2024    Procedure: XI ROBOTIC CHOLECYSTECTOMY;  Surgeon: Edmundo Buck MD;  Location: [facility];  Service: General;  Laterality: N/A;     Family History   Problem Relation Name Age of Onset    No Known Problems Mother      No Known Problems Father      Birth defects Neg Hx       Social History     Tobacco Use    Smoking status: Every Day     Current packs/day: 1.00     Types: Cigarettes    Substance Use Topics    Alcohol use: No    Drug use: No        Review of Systems:  Review of Systems   Constitutional:  Negative for chills, fever and unexpected weight change.   HENT:  Negative for congestion.    Eyes:  Negative for visual disturbance.   Respiratory:  Negative for shortness of breath.    Cardiovascular:  Negative for chest pain.   Gastrointestinal:  Negative for abdominal distention, abdominal pain, constipation, nausea, rectal pain and vomiting.   Genitourinary:  Negative for dysuria.   Musculoskeletal:  Negative for arthralgias.   Skin:  Negative for rash.   Neurological:  Negative for light-headedness.   Hematological:  Negative for adenopathy.          Objective     Vital Signs (Most Recent)  Temp: 98 °F (36.7 °C) (04/22/24 0847)  Pulse: 88 (04/22/24 0847)  BP: 136/81 (04/22/24 0847)     105.2 kg (232 lb)     Physical Exam:  Physical Exam  Vitals reviewed.   Constitutional:       General: He is not in acute distress.     Appearance: He is well-developed. He is not toxic-appearing.   HENT:      Head: Normocephalic and atraumatic.      Right Ear: External ear normal.      Left Ear: External ear normal.      Nose: Nose normal.      Mouth/Throat:      Mouth: Mucous membranes are moist.   Eyes:      Extraocular Movements: Extraocular movements intact.      Conjunctiva/sclera: Conjunctivae normal.   Cardiovascular:      Rate and Rhythm: Normal rate.   Pulmonary:      Effort: Pulmonary effort is normal. No respiratory distress.   Abdominal:      Comments: Abdomen is soft, nontender, nondistended.  Incisions are clean dry and intact, healing well without signs of infection.   Musculoskeletal:      Cervical back: Normal range of motion and neck supple.   Skin:     General: Skin is warm and dry.   Neurological:      Mental Status: He is alert and oriented to person, place, and time.   Psychiatric:         Behavior: Behavior normal.         Pathology:      Component 2 wk ago   Final Pathologic Diagnosis  GALLBLADDER:  CHOLELITHIASIS WITH MODEST CHRONIC FIBROSING CHOLECYSTITIS             Assessment and Plan   48-year-old male with choledocholithiasis now status post robotic cholecystectomy who is recovering as expected.      - pathology reviewed as above, benign.    -no further restrictions or interventions.    -return to clinic as needed or if any issues arise.    Nakia Patterson PA-C  [facility]

## 2025-01-31 PROCEDURE — 99285 EMERGENCY DEPT VISIT HI MDM: CPT

## 2025-02-01 ENCOUNTER — HOSPITAL ENCOUNTER (EMERGENCY)
Facility: HOSPITAL | Age: 50
Discharge: HOME | End: 2025-02-01
Attending: EMERGENCY MEDICINE
Payer: COMMERCIAL

## 2025-02-01 VITALS
WEIGHT: 239 LBS | SYSTOLIC BLOOD PRESSURE: 144 MMHG | BODY MASS INDEX: 32.37 KG/M2 | RESPIRATION RATE: 18 BRPM | HEART RATE: 73 BPM | HEIGHT: 72 IN | OXYGEN SATURATION: 98 % | TEMPERATURE: 98 F | DIASTOLIC BLOOD PRESSURE: 88 MMHG

## 2025-02-01 DIAGNOSIS — M54.50 ACUTE RIGHT-SIDED LOW BACK PAIN WITHOUT SCIATICA: ICD-10-CM

## 2025-02-01 DIAGNOSIS — M25.551 RIGHT HIP PAIN: ICD-10-CM

## 2025-02-01 DIAGNOSIS — M54.2 NECK PAIN: ICD-10-CM
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
## 2025-02-01 PROCEDURE — 25000003 PHARM REV CODE 250: Performed by: EMERGENCY MEDICINE

## 2025-02-01 RX ORDER — ORPHENADRINE CITRATE 100 MG/1
100 TABLET, EXTENDED RELEASE ORAL 2 TIMES DAILY
Qty: 10 TABLET | Refills: 0 | Status: SHIPPED | OUTPATIENT
Start: 2025-02-01

## 2025-02-01 RX ORDER — NAPROXEN 500 MG/1
500 TABLET ORAL 2 TIMES DAILY WITH MEALS
Qty: 10 TABLET | Refills: 0 | Status: SHIPPED | OUTPATIENT
Start: 2025-02-01

## 2025-02-01 RX ORDER — NAPROXEN 500 MG/1
500 TABLET ORAL
Status: COMPLETED | OUTPATIENT
Start: 2025-02-01 | End: 2025-02-01

## 2025-02-01 RX ORDER — FENTANYL CITRATE 50 UG/ML
50 INJECTION, SOLUTION INTRAMUSCULAR; INTRAVENOUS
Status: DISCONTINUED | OUTPATIENT
Start: 2025-02-01 | End: 2025-02-01 | Stop reason: HOSPADM

## 2025-02-01 RX ADMIN — NAPROXEN 500 MG: 500 TABLET ORAL at 02:02
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
## (undated) DEVICE — ANCHOR TISSUE RETRIEVAL SYSTEM

## (undated) DEVICE — TOWEL OR DISP STRL BLUE 4/PK

## (undated) DEVICE — SUT PDS II O CT-2 VIL MONO

## (undated) DEVICE — APPLICATOR CHLORAPREP ORN 26ML

## (undated) DEVICE — DRAPE ARM DAVINCI XI

## (undated) DEVICE — SUT MONOCRYL 4.0 PS2 CP496G

## (undated) DEVICE — ADHESIVE DERMABOND ADVANCED

## (undated) DEVICE — GOWN POLY REINF BRTH SLV XL

## (undated) DEVICE — BAG TISSUE RETRIEVAL 5MM

## (undated) DEVICE — DEVICE CLOSURE DISP 14G

## (undated) DEVICE — SET PNEUMOCLEAR HEAT HUM SE HF

## (undated) DEVICE — HEADREST ROUND DISP FOAM 9IN

## (undated) DEVICE — DRAPE THREE-QTR REINF 53X77IN

## (undated) DEVICE — NDL ECLIPSE SAFETY 23G 1.5IN

## (undated) DEVICE — DRAPE LAPSCP CHOLE 122X102X78

## (undated) DEVICE — DECANTER 6 VIAL

## (undated) DEVICE — DRAPE COLUMN DAVINCI XI

## (undated) DEVICE — SEAL UNIVERSAL 5MM-8MM XI

## (undated) DEVICE — CLIP HEMO-LOK MLX LARGE LF

## (undated) DEVICE — ELECTRODE REM PLYHSV RETURN 9

## (undated) DEVICE — COVER TIP CURVED SCISSORS XI

## (undated) DEVICE — GLOVE SENSICARE PI GRN 7

## (undated) DEVICE — SOL STRL WATER INJ 1000ML BG

## (undated) DEVICE — MANIFOLD 4 PORT

## (undated) DEVICE — PACK BASIC SETUP SC BR

## (undated) DEVICE — SYR LUER-LOCK STERILE 3ML

## (undated) DEVICE — OBTURATOR BLADELESS 8MM XI CLR

## (undated) DEVICE — GLOVE SIGNATURE ESSNTL LTX 7

## (undated) DEVICE — COVER LIGHT HANDLE 80/CA

## (undated) DEVICE — CLIP HEMO-LOK ML

## (undated) DEVICE — SUT CTD VICRYL 0 UND BR SUT

## (undated) DEVICE — SOL NORMAL USPCA 0.9%

## (undated) DEVICE — KIT ANTIFOG W/SPONG & FLUID